# Patient Record
Sex: FEMALE | Race: OTHER | Employment: FULL TIME | URBAN - METROPOLITAN AREA
[De-identification: names, ages, dates, MRNs, and addresses within clinical notes are randomized per-mention and may not be internally consistent; named-entity substitution may affect disease eponyms.]

---

## 2019-06-10 ENCOUNTER — HOSPITAL ENCOUNTER (EMERGENCY)
Age: 34
Discharge: HOME | End: 2019-06-10
Payer: COMMERCIAL

## 2019-06-10 VITALS
SYSTOLIC BLOOD PRESSURE: 127 MMHG | HEIGHT: 63 IN | DIASTOLIC BLOOD PRESSURE: 84 MMHG | TEMPERATURE: 97.5 F | OXYGEN SATURATION: 100 % | HEART RATE: 84 BPM

## 2019-06-10 DIAGNOSIS — N30.00 ACUTE CYSTITIS WITHOUT HEMATURIA: Primary | ICD-10-CM

## 2019-06-10 LAB
BACTERIA: ABNORMAL /HPF
BILIRUBIN URINE: NEGATIVE
BLOOD, URINE: ABNORMAL
CLARITY: CLEAR
COLOR: YELLOW
EPITHELIAL CELLS, UA: ABNORMAL /HPF
GLUCOSE URINE: NEGATIVE MG/DL
KETONES, URINE: NEGATIVE MG/DL
LEUKOCYTE ESTERASE, URINE: ABNORMAL
NITRITE, URINE: NEGATIVE
PH UA: 6 (ref 5–9)
PROTEIN UA: NEGATIVE MG/DL
RBC UA: ABNORMAL /HPF (ref 0–2)
SPECIFIC GRAVITY UA: 1.01 (ref 1–1.03)
UROBILINOGEN, URINE: 0.2 E.U./DL
WBC UA: ABNORMAL /HPF (ref 0–5)

## 2019-06-10 PROCEDURE — 99283 EMERGENCY DEPT VISIT LOW MDM: CPT

## 2019-06-10 PROCEDURE — 81001 URINALYSIS AUTO W/SCOPE: CPT

## 2019-06-10 RX ORDER — NITROFURANTOIN 25; 75 MG/1; MG/1
100 CAPSULE ORAL 2 TIMES DAILY
Qty: 10 CAPSULE | Refills: 0 | Status: SHIPPED | OUTPATIENT
Start: 2019-06-10 | End: 2019-06-15

## 2019-06-10 SDOH — HEALTH STABILITY: MENTAL HEALTH: HOW OFTEN DO YOU HAVE A DRINK CONTAINING ALCOHOL?: NEVER

## 2019-06-10 ASSESSMENT — PAIN DESCRIPTION - DIRECTION: RADIATING_TOWARDS: INCREASES WITH MOVEMENT

## 2019-06-10 ASSESSMENT — PAIN DESCRIPTION - ORIENTATION: ORIENTATION: LOWER

## 2019-06-10 ASSESSMENT — PAIN DESCRIPTION - PAIN TYPE: TYPE: ACUTE PAIN

## 2019-06-10 ASSESSMENT — PAIN DESCRIPTION - FREQUENCY: FREQUENCY: INTERMITTENT

## 2019-06-10 ASSESSMENT — PAIN SCALES - GENERAL: PAINLEVEL_OUTOF10: 9

## 2019-06-12 NOTE — ED PROVIDER NOTES
[facility]       Department of Emergency Medicine   ED  Provider Note  Admit Date/RoomTime: 6/10/2019  9:46 AM  ED Room: 31/31  MRN: [id number]  Chief Complaint: Back Pain (Lower back pain that started 1 week ago. Pain increases with movement. Denies any injury. Pt is 11 weeks pregnant. )       History of Present Illness   Source of history provided by:  patient. History/Exam Limitations: none. Iker Robins is a 27 y.o. female who has a past medical history of: History reviewed. No pertinent past medical history. presents to the ED by private car and is alone for lower back pain, beginning several days ago and are unchanged since that time. The complaint has been persistent, moderate in severity. Patient states for the past week she has had lower back pain. States that she is currently 11 weeks pregnant. Denies any vaginal bleeding or spotting. States she has followed with her OB/GYN and has had a confirmed intrauterine pregnancy. Denies any complications thus far. Denies any fever, chills, nausea, vomiting, chest pain or shortness of breath. ROS    Pertinent positives and negatives are stated within HPI, all other systems reviewed and are negative. History reviewed. No pertinent surgical history. Social History:  reports that she has never smoked. She has never used smokeless tobacco. She reports that she does not drink alcohol. Family History: family history is not on file. Allergies: Pcn [penicillins]    Physical Exam   Oxygen Saturation Interpretation: Normal.   ED Triage Vitals   BP Temp Temp Source Pulse Resp SpO2 Height Weight   06/10/19 0944 06/10/19 0922 06/10/19 0922 06/10/19 0922 -- 06/10/19 0922 06/10/19 0944 --   127/84 97.6 °F (36.4 °C) Tympanic 76  98 % 5' 3\" (1.6 m)        Physical Exam  · Constitutional/General: Alert and oriented x3, well appearing, non toxic  · HEENT:  NC/NT. PERRLA,  Airway patent.   · Neck: Supple, full ROM, non tender to palpation in the midline, no stridor, no crepitus, no meningeal signs  · Respiratory: Lungs clear to auscultation bilaterally, no wheezes, rales, or rhonchi. Not in respiratory distress  · CV:  Regular rate. Regular rhythm. No murmurs, gallops, or rubs. 2+ distal pulses  · GI:  Abdomen Soft, Non tender, Non distended. +BS. No rebound, guarding, or rigidity. No pulsatile masses. · Musculoskeletal: Diffuse lumbar tenderness. No midline spinal tenderness. No CVA tenderness. Moves all extremities x 4. Warm and well perfused, no clubbing, cyanosis, or edema. Capillary refill <3 seconds  · Integument: skin warm and dry. No rashes. · Lymphatic: no lymphadenopathy noted  · Neurologic: GCS 15, no focal deficits, symmetric strength 5/5 in the upper and lower extremities bilaterally  · Psychiatric: Normal Affect    Lab / Imaging Results   (All laboratory and radiology results have been personally reviewed by myself)  Labs:  Results for orders placed or performed during the hospital encounter of 06/10/19   Urinalysis   Result Value Ref Range    Color, UA Yellow Straw/Yellow    Clarity, UA Clear Clear    Glucose, Ur Negative Negative mg/dL    Bilirubin Urine Negative Negative    Ketones, Urine Negative Negative mg/dL    Specific Gravity, UA 1.015 1.005 - 1.030    Blood, Urine TRACE-INTACT Negative    pH, UA 6.0 5.0 - 9.0    Protein, UA Negative Negative mg/dL    Urobilinogen, Urine 0.2 <2.0 E.U./dL    Nitrite, Urine Negative Negative    Leukocyte Esterase, Urine TRACE (A) Negative   Microscopic Urinalysis   Result Value Ref Range    WBC, UA 2-5 0 - 5 /HPF    RBC, UA 1-3 0 - 2 /HPF    Epi Cells RARE /HPF    Bacteria, UA FEW (A) /HPF     Imaging: All Radiology results interpreted by Radiologist unless otherwise noted. No orders to display       ED Course / Medical Decision Making   Medications - No data to display     Consult(s):   None    Procedure(s):   none    MDM:   Patient in no acute distress. Vital signs stable.   Patient denies any injury or trauma. No focal neurologic deficits noted on exam.  Patient currently 11 weeks pregnant. Urinalysis will be obtained. Urinalysis concerning for urinary tract infection. Patient states anaphylactic reaction to penicillins. Unsure if she is ever had cephalosporins. Discussed case with pharmacist.  Since patient is several days away from second trimester,  Will be started on Macrobid. Patient will follow with PCP. Educated on the signs and symptoms to return to the ED. Discharged in stable condition. Counseling: The emergency provider has spoken with the patient and discussed todays results, in addition to providing specific details for the plan of care and counseling regarding the diagnosis and prognosis. Questions are answered at this time and they are agreeable with the plan. Assessment      1. Acute cystitis without hematuria      Plan   Discharge to home  Patient condition is good    New Medications     Discharge Medication List as of 6/10/2019 10:57 AM      START taking these medications    Details   nitrofurantoin, macrocrystal-monohydrate, (MACROBID) 100 MG capsule Take 1 capsule by mouth 2 times daily for 5 days, Disp-10 capsule, R-0Print           Electronically signed by TAZ Olivarez   DD: 6/12/19  **This report was transcribed using voice recognition software. Every effort was made to ensure accuracy; however, inadvertent computerized transcription errors may be present.   END OF ED PROVIDER NOTE       John Olivarez  06/12/19 [id number]

## 2019-08-07 ENCOUNTER — ROUTINE PRENATAL (OUTPATIENT)
Dept: OBGYN CLINIC | Age: 34
End: 2019-08-07
Payer: COMMERCIAL

## 2019-08-07 VITALS
DIASTOLIC BLOOD PRESSURE: 75 MMHG | WEIGHT: 194 LBS | HEIGHT: 63 IN | BODY MASS INDEX: 34.38 KG/M2 | HEART RATE: 98 BPM | SYSTOLIC BLOOD PRESSURE: 125 MMHG

## 2019-08-07 DIAGNOSIS — Z13.79 ENCOUNTER FOR OTHER SCREENING FOR GENETIC AND CHROMOSOMAL ANOMALIES: ICD-10-CM

## 2019-08-07 DIAGNOSIS — O09.522 ELDERLY MULTIGRAVIDA, SECOND TRIMESTER: Primary | ICD-10-CM

## 2019-08-07 DIAGNOSIS — Z3A.19 19 WEEKS GESTATION OF PREGNANCY: ICD-10-CM

## 2019-08-07 DIAGNOSIS — O09.212 PREVIOUS PRETERM DELIVERY IN SECOND TRIMESTER, ANTEPARTUM: ICD-10-CM

## 2019-08-07 DIAGNOSIS — O44.42 LOW-LYING PLACENTA WITHOUT HEMORRHAGE, SECOND TRIMESTER: ICD-10-CM

## 2019-08-07 DIAGNOSIS — O99.322 DRUG DEPENDENCE AFFECTING PREGNANCY IN SECOND TRIMESTER: ICD-10-CM

## 2019-08-07 DIAGNOSIS — Z36.89 ENCOUNTER FOR FETAL ANATOMIC SURVEY: ICD-10-CM

## 2019-08-07 DIAGNOSIS — F12.20 CANNABIS DEPENDENCE (HCC): ICD-10-CM

## 2019-08-07 DIAGNOSIS — Z03.75 SUSPECTED SHORTENING OF CERVIX NOT FOUND: ICD-10-CM

## 2019-08-07 DIAGNOSIS — O99.212 OBESITY AFFECTING PREGNANCY IN SECOND TRIMESTER: ICD-10-CM

## 2019-08-07 LAB
GLUCOSE URINE, POC: NORMAL
PROTEIN UA: POSITIVE

## 2019-08-07 PROCEDURE — G8427 DOCREV CUR MEDS BY ELIG CLIN: HCPCS | Performed by: OBSTETRICS & GYNECOLOGY

## 2019-08-07 PROCEDURE — 99201 HC NEW PT, E/M LEVEL 1: CPT | Performed by: OBSTETRICS & GYNECOLOGY

## 2019-08-07 PROCEDURE — G8417 CALC BMI ABV UP PARAM F/U: HCPCS | Performed by: OBSTETRICS & GYNECOLOGY

## 2019-08-07 PROCEDURE — 76817 TRANSVAGINAL US OBSTETRIC: CPT | Performed by: OBSTETRICS & GYNECOLOGY

## 2019-08-07 PROCEDURE — 36415 COLL VENOUS BLD VENIPUNCTURE: CPT

## 2019-08-07 PROCEDURE — 81002 URINALYSIS NONAUTO W/O SCOPE: CPT | Performed by: OBSTETRICS & GYNECOLOGY

## 2019-08-07 PROCEDURE — 99242 OFF/OP CONSLTJ NEW/EST SF 20: CPT | Performed by: OBSTETRICS & GYNECOLOGY

## 2019-08-07 PROCEDURE — 76811 OB US DETAILED SNGL FETUS: CPT | Performed by: OBSTETRICS & GYNECOLOGY

## 2019-08-07 RX ORDER — HYDROXYPROGESTERONE CAPROATE 250 MG/ML
250 INJECTION INTRAMUSCULAR
Qty: 1 VIAL | Refills: 3
Start: 2019-08-07 | End: 2019-12-04 | Stop reason: ALTCHOICE

## 2019-08-07 RX ORDER — VITAMIN A, VITAMIN C, VITAMIN D-3, VITAMIN E, VITAMIN B-1, VITAMIN B-2, NIACIN, VITAMIN B-6, CALCIUM, IRON, ZINC, COPPER 4000; 120; 400; 22; 1.84; 3; 20; 10; 1; 12; 200; 27; 25; 2 [IU]/1; MG/1; [IU]/1; MG/1; MG/1; MG/1; MG/1; MG/1; MG/1; UG/1; MG/1; MG/1; MG/1; MG/1
TABLET ORAL
Refills: 1 | COMMUNITY
Start: 2019-05-17 | End: 2019-12-19

## 2019-08-07 NOTE — LETTER
CARDIOVASCULAR :  No pain, no palpitations, no edema   RESPIRATORY :  No pain, no shortness of breath   GASTROINTESTINAL : No N/V, no D/C, no abdominal pain   GENITOURINARY :  No dysuria, hematuria and no incontinence   MUSCULOSKELETAL:  No myalgia, No back pain  NEUROLOGICAL :  No numbness, no tingling, no tremors. No history of seizures    FAMILY MEDICAL HISTORY:   Negative for birth defects, chromosomal anomalies and Mental retardation. OB Genetic Screening    Patient's Age 35+ at Date of Delivery Yes     Thalassemia MCV<80 No     Neural Tube Defect No     Congenital Heart Defect No     Down Syndrome No     Ben-Sachs No     Sickle Cell Disease or Trait No     Hemophilia No     Muscular Dystrophy No     Cystic Fibrosis No     [place] Chorea No     Mental Retardation/Autism No     Was Person Treated for Fragilex? No     Other Inherited Genetic Chromosomal Disorder? No     Maternal Metabolic Disorder No     Patient or [de-identified] Father Had Other Defects? No     Recurrent Pregnancy Loss or Still Birth? No      OB Infection History    Live with Someone with or Exposed to TB? No     Patient or Partner has Hx of Genital Herpes? No     Rash or Viral Illness Since LMP? No     History of STD/GC/Chlamydia/HPV/Syphilis? No      During this pregnancy, Mrs Freddi Brunner developed cystitis that was evaluated and treated on 6/10/2019 in the emergency room. Otherwise she had an uneventful course of pregnancy so far. When seen today in our office she had no complaints. PHYSICAL EXAMINATION:    General Appearance:  Healthy looking, alert, no acute distress. Eyes:     No pallor, no icterus, no photophobia. Ears:     No ear drainage. Nose:     No nasal drainage, no paranasal sinus tenderness. Throat:   Mucosa moist, no oral thrush, no exudate. Neck:     No nuchal rigidity. Back:     No CVA tenderness. Abdomen:    Soft nontender.

## 2019-08-07 NOTE — PROGRESS NOTES
chromosomal anomalies, only the amniocentesis does. 6. I offered the amniocentesis to the patient and she declined it . I discussed the cell free DNA test (NIPT) with the patient. I advised her that this a screening test not a diagnostic test.The test screens only for trisomy 21, 18 and 13. It does not replace diagnostic genetic testing. She agreed to have the test. I told the patient that if she changes her mind, she can call our office and schedule the amniocentesis, anytime. She is aware that the  results of the amniocentesis may take 2 weeks or more to complete. The information from the amniocentesis would need to be available by 23 weeks gestation if she plans to act on a abnormal result. Termination of pregnancy is illegal after 24 completed weeks in [place]. 7. Obesity is assosiated with an increased risk of developing gestational diabetes, and disturbance in the growth of her baby, such as Large for dates and small for Dates. Gestational diabetes should be ruled out with a  Glucose tolerance test to be done in your office. If negative it should be repeated at 26-28 weeks of gestation. 8. Weekly Intramuscular injections of 250 mg of 17 AlphahydroxyProgesterone Caproate (17p), starting at 16-18 weeks of gestation, has been shown to decrease the likelihood of another  delivery. I recommend initiation of treatment at 16 weeks. I also recommend serial ultrasound evaluations of cervical length. The cervical length correlates with the risk of  delivery. The cervical length today was reassuring. 9. The ill effects of cigarette smoking during pregnancy and its association with an increased risk of intrauterine growth restriction, placental abruption, and pregnancy loss. In addition to the increased risk of  morbidity and mortality there is an increased risk of sudden infant death syndrome in the households where people smoke. I recommend that she continues to abstain from smoking. 10.  The edge of the placenta is within 2 cm of the internal os, and therefore there is still a risk of vaginal bleeding, and need for delivery if she goes into  labor and her cervix starts to dilate. There is still a good chance that the placenta will pull further away from the internal os with advancing gestation. 11. She is to continue to follow with you in your office for ongoing obstetric care. 12. I recommend follow-up ultrasound evaluation in our office in 6 weeks to check on fetal well-being anatomy and growth and on cervical length and resolution of the previa. Thank you again, doctor, for allowing us to be of service to your patient. If I can be of further assistance, please do not hesitate to call. Sincerely,        Gwen Jones M.D., [address]    Current encounter billing:  RI OFFICE CONSULTATION NEW/ESTAB PATIENT 30 MIN [21244]  US OB Detail Fetal Anatomy Single or 1st [[id number] Custom]  US OB Transvaginal [id number] Custom]    **This report has been created using voice recognition software.  It may contain minor errors     which are inherent in voice recognition technology**

## 2019-08-07 NOTE — PATIENT INSTRUCTIONS
Please arrive for your scheduled appointment at least 15 minutes early with your actual insurance card+ a photo ID. Also if you need any refills ordered or have questions, it may take up 48 hours to reply. Please allow ample time for your refills. Call me when you use last refill. Thank you for your cooperation. Call your primary obstetrician with bleeding, leaking of fluid, abdominal tenderness, headache, blurry vision, epigastric pain and increased urinary frequency. Any questions contact Richard at [phone]. If you are experiencing an emergency and need immediate help, call 911 or go to go emergency room or labor and delivery. if you are sick, not feeling well or have an infectious process going on please reschedule your appointment by calling [phone]. Also if any family members are not feeling well, please do not bring them to your appointment. We appreciate your cooperation. We are doing this in order to protect our pregnant mothers+ their babies. Patient Education        Weeks 18 to 22 of Your Pregnancy: Care Instructions  Your Care Instructions    Your baby is continuing to develop quickly. At this stage, babies can now suck their thumbs,  firmly with their hands, and open and close their eyelids. Sometime between 18 and 22 weeks, you will start to feel your baby move. At first, these small fetal movements feel like fluttering or \"butterflies. \" Some women say that they feel like gas bubbles. As the baby grows, these movements will become stronger. You may also notice that your baby kicks and hiccups. During this time, you may find that your nausea and fatigue are gone. Overall, you may feel better and have more energy than you did in your first trimester. But you may also have new discomforts now, such as sleep problems or leg cramps. This care sheet can help you ease these discomforts. Follow-up care is a key part of your treatment and safety.  Be sure to make and go to all appointments, and call your doctor if you are having problems. It's also a good idea to know your test results and keep a list of the medicines you take. How can you care for yourself at home? Ease sleep problems  · Avoid caffeine in drinks or chocolate late in the day. · Get some exercise every day. · Take a warm shower or bath before bed. · Have a light snack or glass of milk at bedtime. · Do relaxation exercises in bed to calm your mind and body. · Support your legs and back with extra pillows. Try a pillow between your legs if you sleep on your side. · Do not use sleeping pills or alcohol. They could harm your baby. Ease leg cramps  · Do not massage your calf during the cramp. · Sit on a firm bed or chair. Straighten your leg, and bend your foot (flex your ankle) slowly upward, toward your knee. Bend your toes up and down. · Stand on a cool, flat surface. Stretch your toes upward, and take small steps walking on your heels. · Use a heating pad or hot water bottle to help with muscle ache. Prevent leg cramps  · Be sure to get enough calcium. If you are worried that you are not getting enough, talk to your doctor. · Exercise every day, and stretch your legs before bed. · Take a warm bath before bed, and try leg warmers at night. Where can you learn more? Go to https://[employer]. org and sign in to your [employer] account. Enter H386 in the [facility] box to learn more about \"Weeks 18 to 22 of Your Pregnancy: Care Instructions. \"     If you do not have an account, please click on the \"Sign Up Now\" link. Current as of: September 5, 2018  Content Version: 12.0  © 3177-2532 Healthwise, [employer]. Care instructions adapted under license by [facility] ([facility]). If you have questions about a medical condition or this instruction, always ask your healthcare professional. [address] any warranty or liability for your use of this information.          Patient Education Learning About When to Call Your Doctor During Pregnancy (Up to 20 Weeks)  Your Care Instructions    It's common to have concerns about what might be a problem during pregnancy. Although most pregnant women don't have any serious problems, it's important to know when to call your doctor if you have certain symptoms. These are general suggestions. Your doctor may give you some more information about when to call. When to call your doctor (up to 20 weeks)  Call 911 anytime you think you may need emergency care. For example, call if:  · You passed out (lost consciousness). Call your doctor now or seek immediate medical care if:  · You have a fever. · You have vaginal bleeding. · You are dizzy or lightheaded, or you feel like you may faint. · You have symptoms of a urinary tract infection. These may include:  ? Pain or burning when you urinate. ? A frequent need to urinate without being able to pass much urine. ? Pain in the flank, which is just below the rib cage and above the waist on either side of the back. ? Blood in your urine. · You have belly pain. · You think you are having contractions. · You have a sudden release of fluid from your vagina. Watch closely for changes in your health, and be sure to contact your doctor if:  · You have vaginal discharge that smells bad. · You have other concerns about your pregnancy. Follow-up care is a key part of your treatment and safety. Be sure to make and go to all appointments, and call your doctor if you are having problems. It's also a good idea to know your test results and keep a list of the medicines you take. Where can you learn more? Go to https://karie.[employer]. org and sign in to your [employer] account. Enter C731 in the [employer] box to learn more about \"Learning About When to Call Your Doctor During Pregnancy (Up to 20 Weeks). \"     If you do not have an account, please click on the \"Sign Up Now\" link.   Current as of: September 5, 2018  Content Version: 12.0  © 7275-5375 Healthwise, Incorporated. Care instructions adapted under license by [facility] ([facility]). If you have questions about a medical condition or this instruction, always ask your healthcare professional. [address] any warranty or liability for your use of this information.

## 2019-08-15 ENCOUNTER — TELEPHONE (OUTPATIENT)
Dept: OBGYN CLINIC | Age: 34
End: 2019-08-15

## 2019-09-18 ENCOUNTER — ROUTINE PRENATAL (OUTPATIENT)
Dept: OBGYN CLINIC | Age: 34
End: 2019-09-18
Payer: COMMERCIAL

## 2019-09-18 VITALS
SYSTOLIC BLOOD PRESSURE: 118 MMHG | HEART RATE: 102 BPM | DIASTOLIC BLOOD PRESSURE: 76 MMHG | WEIGHT: 207.13 LBS | BODY MASS INDEX: 36.69 KG/M2

## 2019-09-18 DIAGNOSIS — O44.42 LOW-LYING PLACENTA WITHOUT HEMORRHAGE, SECOND TRIMESTER: ICD-10-CM

## 2019-09-18 DIAGNOSIS — O09.522 ELDERLY MULTIGRAVIDA, SECOND TRIMESTER: Primary | ICD-10-CM

## 2019-09-18 DIAGNOSIS — Z3A.25 25 WEEKS GESTATION OF PREGNANCY: ICD-10-CM

## 2019-09-18 DIAGNOSIS — Z03.75 SUSPECTED SHORTENING OF CERVIX NOT FOUND: ICD-10-CM

## 2019-09-18 DIAGNOSIS — O09.212 PREVIOUS PRETERM DELIVERY IN SECOND TRIMESTER, ANTEPARTUM: ICD-10-CM

## 2019-09-18 DIAGNOSIS — Z36.89 ENCOUNTER FOR FETAL ANATOMIC SURVEY: ICD-10-CM

## 2019-09-18 DIAGNOSIS — O99.212 OBESITY AFFECTING PREGNANCY IN SECOND TRIMESTER: ICD-10-CM

## 2019-09-18 LAB
GLUCOSE URINE, POC: NORMAL
PROTEIN UA: POSITIVE

## 2019-09-18 PROCEDURE — 76819 FETAL BIOPHYS PROFIL W/O NST: CPT | Performed by: OBSTETRICS & GYNECOLOGY

## 2019-09-18 PROCEDURE — 81002 URINALYSIS NONAUTO W/O SCOPE: CPT | Performed by: OBSTETRICS & GYNECOLOGY

## 2019-09-18 PROCEDURE — 76817 TRANSVAGINAL US OBSTETRIC: CPT | Performed by: OBSTETRICS & GYNECOLOGY

## 2019-09-18 PROCEDURE — G8417 CALC BMI ABV UP PARAM F/U: HCPCS | Performed by: OBSTETRICS & GYNECOLOGY

## 2019-09-18 PROCEDURE — 99211 OFF/OP EST MAY X REQ PHY/QHP: CPT | Performed by: OBSTETRICS & GYNECOLOGY

## 2019-09-18 PROCEDURE — G8427 DOCREV CUR MEDS BY ELIG CLIN: HCPCS | Performed by: OBSTETRICS & GYNECOLOGY

## 2019-09-18 PROCEDURE — 99213 OFFICE O/P EST LOW 20 MIN: CPT | Performed by: OBSTETRICS & GYNECOLOGY

## 2019-09-18 PROCEDURE — 76805 OB US >/= 14 WKS SNGL FETUS: CPT | Performed by: OBSTETRICS & GYNECOLOGY

## 2019-09-18 PROCEDURE — 1036F TOBACCO NON-USER: CPT | Performed by: OBSTETRICS & GYNECOLOGY

## 2019-09-18 NOTE — LETTER
19     RE:  Jimmie Girard   : 1985   AGE: 27 y.o. REFERRING PHYSICIAN:               Magi Hawkins MD    Dear .  Mrs. Jimmie Girard a 27 y.o.  [id number]  is seen today on follow up in our office. REASON FOR APPOINTMENT:  · Follow-up on pregnant patient with advanced maternal age, history of previous  delivery, obesity marijuana abuse and low-lying placenta on previous ultrasound evaluation. MEDICATIONS:      Prior to Admission medications    Medication Sig Start Date End Date Taking? Authorizing Provider   Prenatal Vit-Fe Fumarate-FA (PRENATAL VITAMIN PLUS LOW IRON) 27-1 MG TABS take 1 tablet by mouth once daily 19  Yes Historical Provider, MD   hydroxyprogesterone caproate 250 MG/ML OIL oil injection Inject 1 mL into the muscle every 7 days 19  Yes Mireya Carranza MD     INTERVAL HISTORY:  Mrs Jimmie Girard had an uneventful course of pregnancy since her last visit to our office. When seen today in our office she had no complaints. She denied any recent cigarette smoking of marijuana abuse. No interval history of vaginal bleeding or spotting. PHYSICAL EXAMINATION:  General Appearance:  Healthy looking, alert, no acute distress. Eyes:     No pallor, no icterus, no photophobia. Ears:     No ear drainage. Nose:     No nasal drainage, no paranasal sinus tenderness. Throat:   Mucosa moist, no oral thrush, no exudate. Neck:     No nuchal rigidity. Back:     No CVA tenderness. Abdomen:    Soft nontender. Extremities:    No pretibial pitting edema, no calf muscle tenderness. Skin:     No rashes, no lesions. BP: 118/76 Weight: 207 lb 2 oz (94 kg)   Pulse: 102     Body mass index is 36.69 kg/m². Urine dipstick:  Glucose : Negative   Albumin:  Trace       An ultrasound evaluation was done in our office today. Please refer to the enclosed copy of the ultrasound report for further information. IMPRESSION:  1. A  25w2d  intrauterine gestation. 2. Elderly multigravida. 3. Obesity. 4. Previous  delivery. 5. Marijuana abuse during pregnancy. 6. Previous Low-lying placenta, resolved. RECOMMENDATIONS/PLAN:  I discussed with the patient the following points:    1. The benefits and limitations of ultrasound prenatal diagnosis and the fact that some defects might not always be seen by ultrasound. Estimated incidence of these defects in the general population is 2- 4%. 2. The size of her baby is appropriate for gestational age and no anomalies are noted. .  3. Obesity is assosiated with an increased risk of developing gestational diabetes, and disturbance in the growth of her baby, such as Large for dates and small for Dates. Gestational diabetes should be ruled out with a  Glucose tolerance test to be done in your office at 26-28 weeks of gestation. 4. The ill effects of cigarette smoking and substance abuse during pregnancy and its association with an increased risk of intrauterine growth restriction, placental abruption, and pregnancy loss. In addition to the increased risk of  morbidity and mortality there is an increased risk of sudden infant death syndrome in the households where people smoke. I recommend that she continues to abstain from cigarette smoking and from illicit drug abuse. 5. She should continue the weekly Intramuscular injections of 250 mg of 17 AlphahydroxyProgesterone Caproate (17p),  to decrease the likelihood of another  delivery. 6. The cervical length correlates with the risk of  delivery. The cervical length today was reassuring. 7. The previously described placenta previa resolved. 8. Fetal well-being was confirmed today.   The amount of fluid around baby is normal.  The Biophysical profile score of 8/8 is reassuring, and the umbilical artery Doppler studies are normal.  9. She should monitor fetal well-being at home by counting movements after

## 2019-09-18 NOTE — PATIENT INSTRUCTIONS
20 Weeks). \"     If you do not have an account, please click on the \"Sign Up Now\" link. Current as of: September 5, 2018  Content Version: 12.1  © 7932-7169 Healthwise, Incorporated. Care instructions adapted under license by [facility] ([facility]). If you have questions about a medical condition or this instruction, always ask your healthcare professional. [address] any warranty or liability for your use of this information.

## 2019-09-18 NOTE — PROGRESS NOTES
multigravida. 3. Obesity. 4. Previous  delivery. 5. Marijuana abuse during pregnancy. 6. Previous Low-lying placenta, resolved. RECOMMENDATIONS/PLAN:  I discussed with the patient the following points:    1. The benefits and limitations of ultrasound prenatal diagnosis and the fact that some defects might not always be seen by ultrasound. Estimated incidence of these defects in the general population is 2- 4%. 2. The size of her baby is appropriate for gestational age and no anomalies are noted. .  3. Obesity is assosiated with an increased risk of developing gestational diabetes, and disturbance in the growth of her baby, such as Large for dates and small for Dates. Gestational diabetes should be ruled out with a  Glucose tolerance test to be done in your office at 26-28 weeks of gestation. 4. The ill effects of cigarette smoking and substance abuse during pregnancy and its association with an increased risk of intrauterine growth restriction, placental abruption, and pregnancy loss. In addition to the increased risk of  morbidity and mortality there is an increased risk of sudden infant death syndrome in the households where people smoke. I recommend that she continues to abstain from cigarette smoking and from illicit drug abuse. 5. She should continue the weekly Intramuscular injections of 250 mg of 17 AlphahydroxyProgesterone Caproate (17p),  to decrease the likelihood of another  delivery. 6. The cervical length correlates with the risk of  delivery. The cervical length today was reassuring. 7. The previously described placenta previa resolved. 8. Fetal well-being was confirmed today. The amount of fluid around baby is normal.  The Biophysical profile score of 8/8 is reassuring, and the umbilical artery Doppler studies are normal.  9. She should monitor fetal well-being at home by counting movements after dinner.   Her baby should  move 10 times in 2 hours;

## 2019-10-09 ENCOUNTER — TELEPHONE (OUTPATIENT)
Dept: OBGYN CLINIC | Age: 34
End: 2019-10-09

## 2019-10-16 ENCOUNTER — ROUTINE PRENATAL (OUTPATIENT)
Dept: OBGYN CLINIC | Age: 34
End: 2019-10-16
Payer: COMMERCIAL

## 2019-10-16 ENCOUNTER — TELEPHONE (OUTPATIENT)
Dept: OBGYN CLINIC | Age: 34
End: 2019-10-16

## 2019-10-16 VITALS
HEART RATE: 111 BPM | SYSTOLIC BLOOD PRESSURE: 125 MMHG | HEIGHT: 63 IN | WEIGHT: 214 LBS | BODY MASS INDEX: 37.92 KG/M2 | DIASTOLIC BLOOD PRESSURE: 79 MMHG

## 2019-10-16 DIAGNOSIS — O09.213 PREVIOUS PRETERM DELIVERY IN THIRD TRIMESTER, ANTEPARTUM: ICD-10-CM

## 2019-10-16 DIAGNOSIS — O99.213 OBESITY AFFECTING PREGNANCY IN THIRD TRIMESTER: ICD-10-CM

## 2019-10-16 DIAGNOSIS — O36.63X0 EXCESSIVE FETAL GROWTH AFFECTING MANAGEMENT OF MOTHER IN SINGLETON PREGNANCY IN THIRD TRIMESTER, ANTEPARTUM: ICD-10-CM

## 2019-10-16 DIAGNOSIS — O09.523 ELDERLY MULTIGRAVIDA, THIRD TRIMESTER: Primary | ICD-10-CM

## 2019-10-16 DIAGNOSIS — Z03.75 SUSPECTED SHORTENING OF CERVIX NOT FOUND: ICD-10-CM

## 2019-10-16 DIAGNOSIS — O24.414 INSULIN CONTROLLED GESTATIONAL DIABETES MELLITUS (GDM) IN THIRD TRIMESTER: ICD-10-CM

## 2019-10-16 DIAGNOSIS — Z3A.29 29 WEEKS GESTATION OF PREGNANCY: ICD-10-CM

## 2019-10-16 PROCEDURE — 99214 OFFICE O/P EST MOD 30 MIN: CPT | Performed by: OBSTETRICS & GYNECOLOGY

## 2019-10-16 PROCEDURE — G8417 CALC BMI ABV UP PARAM F/U: HCPCS | Performed by: OBSTETRICS & GYNECOLOGY

## 2019-10-16 PROCEDURE — G8484 FLU IMMUNIZE NO ADMIN: HCPCS | Performed by: OBSTETRICS & GYNECOLOGY

## 2019-10-16 PROCEDURE — 76817 TRANSVAGINAL US OBSTETRIC: CPT | Performed by: OBSTETRICS & GYNECOLOGY

## 2019-10-16 PROCEDURE — 76819 FETAL BIOPHYS PROFIL W/O NST: CPT | Performed by: OBSTETRICS & GYNECOLOGY

## 2019-10-16 PROCEDURE — 99211 OFF/OP EST MAY X REQ PHY/QHP: CPT | Performed by: OBSTETRICS & GYNECOLOGY

## 2019-10-16 PROCEDURE — G8427 DOCREV CUR MEDS BY ELIG CLIN: HCPCS | Performed by: OBSTETRICS & GYNECOLOGY

## 2019-10-16 PROCEDURE — 76816 OB US FOLLOW-UP PER FETUS: CPT | Performed by: OBSTETRICS & GYNECOLOGY

## 2019-10-16 PROCEDURE — 1036F TOBACCO NON-USER: CPT | Performed by: OBSTETRICS & GYNECOLOGY

## 2019-10-16 RX ORDER — LANCETS 30 GAUGE
EACH MISCELLANEOUS
Qty: 120 EACH | Refills: 3 | Status: ON HOLD | OUTPATIENT
Start: 2019-10-16 | End: 2019-12-12 | Stop reason: HOSPADM

## 2019-10-16 RX ORDER — BLOOD-GLUCOSE METER
1 KIT MISCELLANEOUS DAILY PRN
Qty: 1 KIT | Refills: 0 | Status: ON HOLD | OUTPATIENT
Start: 2019-10-16 | End: 2019-12-12 | Stop reason: HOSPADM

## 2019-10-22 ENCOUNTER — HOSPITAL ENCOUNTER (OUTPATIENT)
Dept: DIABETES SERVICES | Age: 34
Setting detail: THERAPIES SERIES
Discharge: HOME | End: 2019-10-22
Payer: COMMERCIAL

## 2019-10-22 VITALS — WEIGHT: 214 LBS | HEIGHT: 63 IN | BODY MASS INDEX: 37.92 KG/M2

## 2019-10-22 PROCEDURE — G0108 DIAB MANAGE TRN  PER INDIV: HCPCS | Performed by: DIETITIAN, REGISTERED

## 2019-10-22 ASSESSMENT — PATIENT HEALTH QUESTIONNAIRE - PHQ9
2. FEELING DOWN, DEPRESSED OR HOPELESS: 0
1. LITTLE INTEREST OR PLEASURE IN DOING THINGS: 0
SUM OF ALL RESPONSES TO PHQ QUESTIONS 1-9: 0
SUM OF ALL RESPONSES TO PHQ9 QUESTIONS 1 & 2: 0
SUM OF ALL RESPONSES TO PHQ QUESTIONS 1-9: 0

## 2019-10-29 ENCOUNTER — ROUTINE PRENATAL (OUTPATIENT)
Dept: OBGYN CLINIC | Age: 34
End: 2019-10-29
Payer: COMMERCIAL

## 2019-10-29 ENCOUNTER — TELEPHONE (OUTPATIENT)
Dept: OBGYN CLINIC | Age: 34
End: 2019-10-29

## 2019-10-29 ENCOUNTER — HOSPITAL ENCOUNTER (OUTPATIENT)
Age: 34
Discharge: HOME | End: 2019-10-30
Attending: OBSTETRICS & GYNECOLOGY | Admitting: OBSTETRICS & GYNECOLOGY
Payer: COMMERCIAL

## 2019-10-29 VITALS
HEIGHT: 63 IN | WEIGHT: 213 LBS | BODY MASS INDEX: 37.74 KG/M2 | DIASTOLIC BLOOD PRESSURE: 79 MMHG | HEART RATE: 99 BPM | SYSTOLIC BLOOD PRESSURE: 135 MMHG

## 2019-10-29 DIAGNOSIS — O99.213 OBESITY AFFECTING PREGNANCY IN THIRD TRIMESTER: ICD-10-CM

## 2019-10-29 DIAGNOSIS — O09.523 ELDERLY MULTIGRAVIDA, THIRD TRIMESTER: ICD-10-CM

## 2019-10-29 DIAGNOSIS — O36.63X0 EXCESSIVE FETAL GROWTH AFFECTING MANAGEMENT OF MOTHER IN SINGLETON PREGNANCY IN THIRD TRIMESTER, ANTEPARTUM: ICD-10-CM

## 2019-10-29 DIAGNOSIS — Z3A.31 31 WEEKS GESTATION OF PREGNANCY: ICD-10-CM

## 2019-10-29 DIAGNOSIS — Z03.75 SUSPECTED SHORTENING OF CERVIX NOT FOUND: ICD-10-CM

## 2019-10-29 DIAGNOSIS — O09.213 PREVIOUS PRETERM DELIVERY IN THIRD TRIMESTER, ANTEPARTUM: ICD-10-CM

## 2019-10-29 DIAGNOSIS — O24.414 INSULIN CONTROLLED GESTATIONAL DIABETES MELLITUS (GDM) IN THIRD TRIMESTER: Primary | ICD-10-CM

## 2019-10-29 PROBLEM — O24.419 GESTATIONAL DIABETES MELLITUS (GDM) AFFECTING PREGNANCY: Status: ACTIVE | Noted: 2019-10-29

## 2019-10-29 LAB
ABO/RH: NORMAL
ALBUMIN SERPL-MCNC: 3.7 G/DL (ref 3.5–5.2)
ALP BLD-CCNC: 109 U/L (ref 35–104)
ALT SERPL-CCNC: 7 U/L (ref 0–32)
ANION GAP SERPL CALCULATED.3IONS-SCNC: 16 MMOL/L (ref 7–16)
ANTIBODY SCREEN: NORMAL
AST SERPL-CCNC: 9 U/L (ref 0–31)
BACTERIA: ABNORMAL /HPF
BILIRUB SERPL-MCNC: <0.2 MG/DL (ref 0–1.2)
BILIRUBIN URINE: NEGATIVE
BLOOD, URINE: ABNORMAL
BUN BLDV-MCNC: 6 MG/DL (ref 6–20)
CALCIUM SERPL-MCNC: 9.5 MG/DL (ref 8.6–10.2)
CHLORIDE BLD-SCNC: 97 MMOL/L (ref 98–107)
CLARITY: CLEAR
CO2: 21 MMOL/L (ref 22–29)
COLOR: YELLOW
CREAT SERPL-MCNC: 0.4 MG/DL (ref 0.5–1)
EPITHELIAL CELLS, UA: ABNORMAL /HPF
GFR AFRICAN AMERICAN: >60
GFR NON-AFRICAN AMERICAN: >60 ML/MIN/1.73
GLUCOSE BLD-MCNC: 165 MG/DL (ref 74–99)
GLUCOSE URINE, POC: NEGATIVE
GLUCOSE URINE: NEGATIVE MG/DL
HCT VFR BLD CALC: 32.1 % (ref 34–48)
HEMOGLOBIN: 10.1 G/DL (ref 11.5–15.5)
KETONES, URINE: ABNORMAL MG/DL
LEUKOCYTE ESTERASE, URINE: NEGATIVE
MCH RBC QN AUTO: 27.4 PG (ref 26–35)
MCHC RBC AUTO-ENTMCNC: 31.5 % (ref 32–34.5)
MCV RBC AUTO: 87.2 FL (ref 80–99.9)
METER GLUCOSE: 128 MG/DL (ref 74–99)
METER GLUCOSE: 173 MG/DL (ref 74–99)
NITRITE, URINE: NEGATIVE
PDW BLD-RTO: 13.6 FL (ref 11.5–15)
PH UA: 6 (ref 5–9)
PLATELET # BLD: 240 E9/L (ref 130–450)
PMV BLD AUTO: 10.3 FL (ref 7–12)
POTASSIUM SERPL-SCNC: 3.6 MMOL/L (ref 3.5–5)
PROTEIN UA: ABNORMAL MG/DL
PROTEIN UA: POSITIVE
RBC # BLD: 3.68 E12/L (ref 3.5–5.5)
RBC UA: ABNORMAL /HPF (ref 0–2)
SODIUM BLD-SCNC: 134 MMOL/L (ref 132–146)
SPECIFIC GRAVITY UA: 1.02 (ref 1–1.03)
TOTAL PROTEIN: 7.8 G/DL (ref 6.4–8.3)
UROBILINOGEN, URINE: 0.2 E.U./DL
WBC # BLD: 16.9 E9/L (ref 4.5–11.5)
WBC UA: ABNORMAL /HPF (ref 0–5)

## 2019-10-29 PROCEDURE — 1036F TOBACCO NON-USER: CPT | Performed by: OBSTETRICS & GYNECOLOGY

## 2019-10-29 PROCEDURE — 81002 URINALYSIS NONAUTO W/O SCOPE: CPT | Performed by: OBSTETRICS & GYNECOLOGY

## 2019-10-29 PROCEDURE — 86900 BLOOD TYPING SEROLOGIC ABO: CPT

## 2019-10-29 PROCEDURE — 86901 BLOOD TYPING SEROLOGIC RH(D): CPT

## 2019-10-29 PROCEDURE — 81001 URINALYSIS AUTO W/SCOPE: CPT

## 2019-10-29 PROCEDURE — 76815 OB US LIMITED FETUS(S): CPT | Performed by: OBSTETRICS & GYNECOLOGY

## 2019-10-29 PROCEDURE — 85027 COMPLETE CBC AUTOMATED: CPT

## 2019-10-29 PROCEDURE — 86850 RBC ANTIBODY SCREEN: CPT

## 2019-10-29 PROCEDURE — 82962 GLUCOSE BLOOD TEST: CPT

## 2019-10-29 PROCEDURE — 87088 URINE BACTERIA CULTURE: CPT

## 2019-10-29 PROCEDURE — G8484 FLU IMMUNIZE NO ADMIN: HCPCS | Performed by: OBSTETRICS & GYNECOLOGY

## 2019-10-29 PROCEDURE — 76819 FETAL BIOPHYS PROFIL W/O NST: CPT | Performed by: OBSTETRICS & GYNECOLOGY

## 2019-10-29 PROCEDURE — 99213 OFFICE O/P EST LOW 20 MIN: CPT | Performed by: OBSTETRICS & GYNECOLOGY

## 2019-10-29 PROCEDURE — 76817 TRANSVAGINAL US OBSTETRIC: CPT | Performed by: OBSTETRICS & GYNECOLOGY

## 2019-10-29 PROCEDURE — 99211 OFF/OP EST MAY X REQ PHY/QHP: CPT | Performed by: OBSTETRICS & GYNECOLOGY

## 2019-10-29 PROCEDURE — 36415 COLL VENOUS BLD VENIPUNCTURE: CPT

## 2019-10-29 PROCEDURE — G8427 DOCREV CUR MEDS BY ELIG CLIN: HCPCS | Performed by: OBSTETRICS & GYNECOLOGY

## 2019-10-29 PROCEDURE — 99243 OFF/OP CNSLTJ NEW/EST LOW 30: CPT | Performed by: INTERNAL MEDICINE

## 2019-10-29 PROCEDURE — G8417 CALC BMI ABV UP PARAM F/U: HCPCS | Performed by: OBSTETRICS & GYNECOLOGY

## 2019-10-29 PROCEDURE — 6370000000 HC RX 637 (ALT 250 FOR IP): Performed by: OBSTETRICS & GYNECOLOGY

## 2019-10-29 PROCEDURE — 80053 COMPREHEN METABOLIC PANEL: CPT

## 2019-10-29 RX ORDER — INSULIN GLARGINE 100 [IU]/ML
8 INJECTION, SOLUTION SUBCUTANEOUS NIGHTLY
Status: DISCONTINUED | OUTPATIENT
Start: 2019-10-29 | End: 2019-10-30 | Stop reason: HOSPADM

## 2019-10-29 RX ADMIN — INSULIN GLARGINE 8 UNITS: 100 INJECTION, SOLUTION SUBCUTANEOUS at 21:26

## 2019-10-29 RX ADMIN — INSULIN LISPRO 8 UNITS: 100 INJECTION, SOLUTION INTRAVENOUS; SUBCUTANEOUS at 17:57

## 2019-10-30 VITALS
RESPIRATION RATE: 20 BRPM | TEMPERATURE: 98.4 F | DIASTOLIC BLOOD PRESSURE: 68 MMHG | HEART RATE: 95 BPM | SYSTOLIC BLOOD PRESSURE: 119 MMHG

## 2019-10-30 DIAGNOSIS — O24.414 INSULIN CONTROLLED GESTATIONAL DIABETES MELLITUS (GDM) IN THIRD TRIMESTER: Primary | ICD-10-CM

## 2019-10-30 LAB
METER GLUCOSE: 146 MG/DL (ref 74–99)
URINE CULTURE, ROUTINE: NORMAL

## 2019-10-30 PROCEDURE — 76819 FETAL BIOPHYS PROFIL W/O NST: CPT | Performed by: OBSTETRICS & GYNECOLOGY

## 2019-10-30 PROCEDURE — 99214 OFFICE O/P EST MOD 30 MIN: CPT | Performed by: OBSTETRICS & GYNECOLOGY

## 2019-10-30 PROCEDURE — 76819 FETAL BIOPHYS PROFIL W/O NST: CPT

## 2019-10-30 PROCEDURE — 6370000000 HC RX 637 (ALT 250 FOR IP)

## 2019-10-30 PROCEDURE — 99212 OFFICE O/P EST SF 10 MIN: CPT

## 2019-10-30 PROCEDURE — 82962 GLUCOSE BLOOD TEST: CPT

## 2019-10-30 PROCEDURE — 6370000000 HC RX 637 (ALT 250 FOR IP): Performed by: OBSTETRICS & GYNECOLOGY

## 2019-10-30 RX ORDER — ACETAMINOPHEN 325 MG/1
650 TABLET ORAL EVERY 4 HOURS PRN
Status: DISCONTINUED | OUTPATIENT
Start: 2019-10-30 | End: 2019-10-30 | Stop reason: HOSPADM

## 2019-10-30 RX ORDER — ACETAMINOPHEN 325 MG/1
TABLET ORAL
Status: COMPLETED
Start: 2019-10-30 | End: 2019-10-30

## 2019-10-30 RX ADMIN — ACETAMINOPHEN 650 MG: 325 TABLET ORAL at 00:24

## 2019-10-30 RX ADMIN — INSULIN LISPRO 8 UNITS: 100 INJECTION, SOLUTION INTRAVENOUS; SUBCUTANEOUS at 08:57

## 2019-10-30 ASSESSMENT — PAIN SCALES - GENERAL: PAINLEVEL_OUTOF10: 6

## 2019-11-06 ENCOUNTER — ROUTINE PRENATAL (OUTPATIENT)
Dept: OBGYN CLINIC | Age: 34
End: 2019-11-06
Payer: COMMERCIAL

## 2019-11-06 VITALS
SYSTOLIC BLOOD PRESSURE: 129 MMHG | DIASTOLIC BLOOD PRESSURE: 72 MMHG | WEIGHT: 217 LBS | BODY MASS INDEX: 38.44 KG/M2 | HEART RATE: 118 BPM

## 2019-11-06 DIAGNOSIS — O36.63X0 EXCESSIVE FETAL GROWTH AFFECTING MANAGEMENT OF MOTHER IN SINGLETON PREGNANCY IN THIRD TRIMESTER, ANTEPARTUM: ICD-10-CM

## 2019-11-06 DIAGNOSIS — O24.414 INSULIN CONTROLLED GESTATIONAL DIABETES MELLITUS (GDM) IN THIRD TRIMESTER: Primary | ICD-10-CM

## 2019-11-06 DIAGNOSIS — O09.523 ELDERLY MULTIGRAVIDA, THIRD TRIMESTER: ICD-10-CM

## 2019-11-06 DIAGNOSIS — O99.213 OBESITY AFFECTING PREGNANCY IN THIRD TRIMESTER: ICD-10-CM

## 2019-11-06 DIAGNOSIS — Z3A.32 32 WEEKS GESTATION OF PREGNANCY: ICD-10-CM

## 2019-11-06 DIAGNOSIS — Z91.199 PROBLEM WITH MEDICAL CARE COMPLIANCE: ICD-10-CM

## 2019-11-06 DIAGNOSIS — O09.213 PREVIOUS PRETERM DELIVERY IN THIRD TRIMESTER, ANTEPARTUM: ICD-10-CM

## 2019-11-06 PROCEDURE — 76819 FETAL BIOPHYS PROFIL W/O NST: CPT | Performed by: OBSTETRICS & GYNECOLOGY

## 2019-11-06 PROCEDURE — 99211 OFF/OP EST MAY X REQ PHY/QHP: CPT | Performed by: OBSTETRICS & GYNECOLOGY

## 2019-11-06 PROCEDURE — 81002 URINALYSIS NONAUTO W/O SCOPE: CPT | Performed by: OBSTETRICS & GYNECOLOGY

## 2019-11-06 PROCEDURE — 99213 OFFICE O/P EST LOW 20 MIN: CPT | Performed by: OBSTETRICS & GYNECOLOGY

## 2019-11-06 PROCEDURE — G8417 CALC BMI ABV UP PARAM F/U: HCPCS | Performed by: OBSTETRICS & GYNECOLOGY

## 2019-11-06 PROCEDURE — 76816 OB US FOLLOW-UP PER FETUS: CPT | Performed by: OBSTETRICS & GYNECOLOGY

## 2019-11-06 PROCEDURE — G8427 DOCREV CUR MEDS BY ELIG CLIN: HCPCS | Performed by: OBSTETRICS & GYNECOLOGY

## 2019-11-06 PROCEDURE — 1036F TOBACCO NON-USER: CPT | Performed by: OBSTETRICS & GYNECOLOGY

## 2019-11-06 PROCEDURE — G8484 FLU IMMUNIZE NO ADMIN: HCPCS | Performed by: OBSTETRICS & GYNECOLOGY

## 2019-11-12 ENCOUNTER — HOSPITAL ENCOUNTER (OUTPATIENT)
Age: 34
Discharge: HOME | End: 2019-11-12
Attending: OBSTETRICS & GYNECOLOGY | Admitting: OBSTETRICS & GYNECOLOGY
Payer: COMMERCIAL

## 2019-11-12 VITALS
BODY MASS INDEX: 38.8 KG/M2 | HEIGHT: 63 IN | HEART RATE: 102 BPM | TEMPERATURE: 97.2 F | DIASTOLIC BLOOD PRESSURE: 60 MMHG | RESPIRATION RATE: 16 BRPM | SYSTOLIC BLOOD PRESSURE: 112 MMHG | WEIGHT: 219 LBS

## 2019-11-12 PROBLEM — Z3A.33 33 WEEKS GESTATION OF PREGNANCY: Status: ACTIVE | Noted: 2019-11-12

## 2019-11-12 LAB
AMPHETAMINE SCREEN, URINE: NOT DETECTED
BACTERIA: ABNORMAL /HPF
BARBITURATE SCREEN URINE: NOT DETECTED
BENZODIAZEPINE SCREEN, URINE: NOT DETECTED
BILIRUBIN URINE: NEGATIVE
BLOOD, URINE: ABNORMAL
CANNABINOID SCREEN URINE: NOT DETECTED
CLARITY: CLEAR
COCAINE METABOLITE SCREEN URINE: NOT DETECTED
COLOR: YELLOW
EPITHELIAL CELLS, UA: ABNORMAL /HPF
FENTANYL SCREEN, URINE: NOT DETECTED
FETAL FIBRONECTIN: NEGATIVE
GLUCOSE URINE: 500 MG/DL
KETONES, URINE: 40 MG/DL
LEUKOCYTE ESTERASE, URINE: NEGATIVE
Lab: NORMAL
METHADONE SCREEN, URINE: NOT DETECTED
NITRITE, URINE: NEGATIVE
OPIATE SCREEN URINE: NOT DETECTED
OXYCODONE URINE: NOT DETECTED
PH UA: 5.5 (ref 5–9)
PHENCYCLIDINE SCREEN URINE: NOT DETECTED
PROTEIN UA: ABNORMAL MG/DL
RBC UA: ABNORMAL /HPF (ref 0–2)
SPECIFIC GRAVITY UA: >=1.03 (ref 1–1.03)
UROBILINOGEN, URINE: 0.2 E.U./DL
WBC UA: ABNORMAL /HPF (ref 0–5)

## 2019-11-12 PROCEDURE — 99212 OFFICE O/P EST SF 10 MIN: CPT

## 2019-11-12 PROCEDURE — 81001 URINALYSIS AUTO W/SCOPE: CPT

## 2019-11-12 PROCEDURE — 82731 ASSAY OF FETAL FIBRONECTIN: CPT

## 2019-11-12 PROCEDURE — 80307 DRUG TEST PRSMV CHEM ANLYZR: CPT

## 2019-11-12 RX ORDER — SODIUM CHLORIDE, SODIUM LACTATE, POTASSIUM CHLORIDE, CALCIUM CHLORIDE 600; 310; 30; 20 MG/100ML; MG/100ML; MG/100ML; MG/100ML
INJECTION, SOLUTION INTRAVENOUS CONTINUOUS
Status: DISCONTINUED | OUTPATIENT
Start: 2019-11-12 | End: 2019-11-13 | Stop reason: HOSPADM

## 2019-11-15 ENCOUNTER — TELEPHONE (OUTPATIENT)
Dept: OBGYN CLINIC | Age: 34
End: 2019-11-15

## 2019-11-27 ENCOUNTER — ROUTINE PRENATAL (OUTPATIENT)
Dept: OBGYN CLINIC | Age: 34
End: 2019-11-27
Payer: COMMERCIAL

## 2019-11-27 VITALS
HEIGHT: 63 IN | DIASTOLIC BLOOD PRESSURE: 80 MMHG | WEIGHT: 224 LBS | HEART RATE: 107 BPM | BODY MASS INDEX: 39.69 KG/M2 | SYSTOLIC BLOOD PRESSURE: 132 MMHG

## 2019-11-27 DIAGNOSIS — O40.3XX0 POLYHYDRAMNIOS, THIRD TRIMESTER, NOT APPLICABLE OR UNSPECIFIED: ICD-10-CM

## 2019-11-27 DIAGNOSIS — O24.414 INSULIN CONTROLLED GESTATIONAL DIABETES MELLITUS (GDM) IN THIRD TRIMESTER: Primary | ICD-10-CM

## 2019-11-27 DIAGNOSIS — O09.523 ELDERLY MULTIGRAVIDA, THIRD TRIMESTER: ICD-10-CM

## 2019-11-27 DIAGNOSIS — O36.63X0 EXCESSIVE FETAL GROWTH AFFECTING MANAGEMENT OF MOTHER IN SINGLETON PREGNANCY IN THIRD TRIMESTER, ANTEPARTUM: ICD-10-CM

## 2019-11-27 DIAGNOSIS — O99.213 OBESITY AFFECTING PREGNANCY IN THIRD TRIMESTER: ICD-10-CM

## 2019-11-27 DIAGNOSIS — Z3A.35 35 WEEKS GESTATION OF PREGNANCY: ICD-10-CM

## 2019-11-27 DIAGNOSIS — O09.213 PREVIOUS PRETERM DELIVERY IN THIRD TRIMESTER, ANTEPARTUM: ICD-10-CM

## 2019-11-27 LAB
GLUCOSE URINE, POC: NEGATIVE
PROTEIN UA: ABNORMAL

## 2019-11-27 PROCEDURE — 81002 URINALYSIS NONAUTO W/O SCOPE: CPT | Performed by: OBSTETRICS & GYNECOLOGY

## 2019-11-27 PROCEDURE — G8427 DOCREV CUR MEDS BY ELIG CLIN: HCPCS | Performed by: OBSTETRICS & GYNECOLOGY

## 2019-11-27 PROCEDURE — G8417 CALC BMI ABV UP PARAM F/U: HCPCS | Performed by: OBSTETRICS & GYNECOLOGY

## 2019-11-27 PROCEDURE — 99211 OFF/OP EST MAY X REQ PHY/QHP: CPT | Performed by: OBSTETRICS & GYNECOLOGY

## 2019-11-27 PROCEDURE — 76820 UMBILICAL ARTERY ECHO: CPT | Performed by: OBSTETRICS & GYNECOLOGY

## 2019-11-27 PROCEDURE — 76818 FETAL BIOPHYS PROFILE W/NST: CPT | Performed by: OBSTETRICS & GYNECOLOGY

## 2019-11-27 PROCEDURE — 99214 OFFICE O/P EST MOD 30 MIN: CPT | Performed by: OBSTETRICS & GYNECOLOGY

## 2019-11-27 PROCEDURE — G8484 FLU IMMUNIZE NO ADMIN: HCPCS | Performed by: OBSTETRICS & GYNECOLOGY

## 2019-11-27 PROCEDURE — 76816 OB US FOLLOW-UP PER FETUS: CPT | Performed by: OBSTETRICS & GYNECOLOGY

## 2019-11-27 PROCEDURE — 1036F TOBACCO NON-USER: CPT | Performed by: OBSTETRICS & GYNECOLOGY

## 2019-12-04 ENCOUNTER — ROUTINE PRENATAL (OUTPATIENT)
Dept: OBGYN CLINIC | Age: 34
End: 2019-12-04
Payer: COMMERCIAL

## 2019-12-04 VITALS
HEIGHT: 63 IN | BODY MASS INDEX: 40.22 KG/M2 | DIASTOLIC BLOOD PRESSURE: 75 MMHG | SYSTOLIC BLOOD PRESSURE: 119 MMHG | HEART RATE: 102 BPM | WEIGHT: 227 LBS

## 2019-12-04 DIAGNOSIS — O99.213 OBESITY AFFECTING PREGNANCY IN THIRD TRIMESTER: ICD-10-CM

## 2019-12-04 DIAGNOSIS — Z3A.36 36 WEEKS GESTATION OF PREGNANCY: ICD-10-CM

## 2019-12-04 DIAGNOSIS — O09.523 ELDERLY MULTIGRAVIDA, THIRD TRIMESTER: ICD-10-CM

## 2019-12-04 DIAGNOSIS — O24.414 INSULIN CONTROLLED GESTATIONAL DIABETES MELLITUS (GDM) IN THIRD TRIMESTER: Primary | ICD-10-CM

## 2019-12-04 DIAGNOSIS — O40.3XX0 POLYHYDRAMNIOS IN THIRD TRIMESTER COMPLICATION, SINGLE OR UNSPECIFIED FETUS: ICD-10-CM

## 2019-12-04 DIAGNOSIS — O36.63X0 EXCESSIVE FETAL GROWTH AFFECTING MANAGEMENT OF MOTHER IN SINGLETON PREGNANCY IN THIRD TRIMESTER, ANTEPARTUM: ICD-10-CM

## 2019-12-04 LAB
GLUCOSE URINE, POC: NEGATIVE
PROTEIN UA: NEGATIVE

## 2019-12-04 PROCEDURE — 99211 OFF/OP EST MAY X REQ PHY/QHP: CPT | Performed by: OBSTETRICS & GYNECOLOGY

## 2019-12-04 PROCEDURE — G8484 FLU IMMUNIZE NO ADMIN: HCPCS | Performed by: OBSTETRICS & GYNECOLOGY

## 2019-12-04 PROCEDURE — G8417 CALC BMI ABV UP PARAM F/U: HCPCS | Performed by: OBSTETRICS & GYNECOLOGY

## 2019-12-04 PROCEDURE — G8427 DOCREV CUR MEDS BY ELIG CLIN: HCPCS | Performed by: OBSTETRICS & GYNECOLOGY

## 2019-12-04 PROCEDURE — 76818 FETAL BIOPHYS PROFILE W/NST: CPT | Performed by: OBSTETRICS & GYNECOLOGY

## 2019-12-04 PROCEDURE — 1036F TOBACCO NON-USER: CPT | Performed by: OBSTETRICS & GYNECOLOGY

## 2019-12-04 PROCEDURE — 81002 URINALYSIS NONAUTO W/O SCOPE: CPT | Performed by: OBSTETRICS & GYNECOLOGY

## 2019-12-04 PROCEDURE — 99214 OFFICE O/P EST MOD 30 MIN: CPT | Performed by: OBSTETRICS & GYNECOLOGY

## 2019-12-05 ENCOUNTER — TELEPHONE (OUTPATIENT)
Dept: OBGYN CLINIC | Age: 34
End: 2019-12-05

## 2019-12-08 ENCOUNTER — ANESTHESIA EVENT (OUTPATIENT)
Dept: LABOR AND DELIVERY | Age: 34
DRG: 540 | End: 2019-12-08
Payer: COMMERCIAL

## 2019-12-08 ENCOUNTER — ANESTHESIA (OUTPATIENT)
Dept: LABOR AND DELIVERY | Age: 34
DRG: 540 | End: 2019-12-08
Payer: COMMERCIAL

## 2019-12-08 ENCOUNTER — HOSPITAL ENCOUNTER (INPATIENT)
Dept: LABOR AND DELIVERY | Age: 34
LOS: 4 days | Discharge: HOME | DRG: 540 | End: 2019-12-12
Attending: OBSTETRICS & GYNECOLOGY | Admitting: OBSTETRICS & GYNECOLOGY
Payer: COMMERCIAL

## 2019-12-08 VITALS — SYSTOLIC BLOOD PRESSURE: 119 MMHG | OXYGEN SATURATION: 100 % | DIASTOLIC BLOOD PRESSURE: 58 MMHG

## 2019-12-08 DIAGNOSIS — G89.18 ACUTE POSTOPERATIVE PAIN OF ABDOMEN: Primary | ICD-10-CM

## 2019-12-08 DIAGNOSIS — R10.9 ACUTE POSTOPERATIVE PAIN OF ABDOMEN: Primary | ICD-10-CM

## 2019-12-08 PROBLEM — Z34.93 THIRD TRIMESTER PREGNANCY AT LESS THAN 36 WEEKS: Status: ACTIVE | Noted: 2019-12-08

## 2019-12-08 PROBLEM — Z3A.36 36 WEEKS GESTATION OF PREGNANCY: Status: ACTIVE | Noted: 2019-12-08

## 2019-12-08 LAB
ABO/RH: NORMAL
AMPHETAMINE SCREEN, URINE: NOT DETECTED
ANTIBODY SCREEN: NORMAL
BARBITURATE SCREEN URINE: NOT DETECTED
BENZODIAZEPINE SCREEN, URINE: NOT DETECTED
CANNABINOID SCREEN URINE: NOT DETECTED
COCAINE METABOLITE SCREEN URINE: NOT DETECTED
FENTANYL SCREEN, URINE: NOT DETECTED
HCT VFR BLD CALC: 31.6 % (ref 34–48)
HEMOGLOBIN: 9.7 G/DL (ref 11.5–15.5)
Lab: NORMAL
MCH RBC QN AUTO: 24.9 PG (ref 26–35)
MCHC RBC AUTO-ENTMCNC: 30.7 % (ref 32–34.5)
MCV RBC AUTO: 81 FL (ref 80–99.9)
METER GLUCOSE: 74 MG/DL (ref 74–99)
METHADONE SCREEN, URINE: NOT DETECTED
OPIATE SCREEN URINE: NOT DETECTED
OXYCODONE URINE: NOT DETECTED
PDW BLD-RTO: 15.5 FL (ref 11.5–15)
PHENCYCLIDINE SCREEN URINE: NOT DETECTED
PLATELET # BLD: 237 E9/L (ref 130–450)
PMV BLD AUTO: 11.8 FL (ref 7–12)
RBC # BLD: 3.9 E12/L (ref 3.5–5.5)
WBC # BLD: 11 E9/L (ref 4.5–11.5)

## 2019-12-08 PROCEDURE — 3700000000 HC ANESTHESIA ATTENDED CARE: Performed by: OBSTETRICS & GYNECOLOGY

## 2019-12-08 PROCEDURE — 7100000001 HC PACU RECOVERY - ADDTL 15 MIN: Performed by: OBSTETRICS & GYNECOLOGY

## 2019-12-08 PROCEDURE — 80307 DRUG TEST PRSMV CHEM ANLYZR: CPT

## 2019-12-08 PROCEDURE — 85027 COMPLETE CBC AUTOMATED: CPT

## 2019-12-08 PROCEDURE — 3609079900 HC CESAREAN SECTION: Performed by: OBSTETRICS & GYNECOLOGY

## 2019-12-08 PROCEDURE — 6360000002 HC RX W HCPCS: Performed by: ANESTHESIOLOGY

## 2019-12-08 PROCEDURE — 2500000003 HC RX 250 WO HCPCS: Performed by: NURSE ANESTHETIST, CERTIFIED REGISTERED

## 2019-12-08 PROCEDURE — 6370000000 HC RX 637 (ALT 250 FOR IP): Performed by: ANESTHESIOLOGY

## 2019-12-08 PROCEDURE — 1220000000 HC SEMI PRIVATE OB R&B

## 2019-12-08 PROCEDURE — 6360000002 HC RX W HCPCS: Performed by: OBSTETRICS & GYNECOLOGY

## 2019-12-08 PROCEDURE — 7100000000 HC PACU RECOVERY - FIRST 15 MIN: Performed by: OBSTETRICS & GYNECOLOGY

## 2019-12-08 PROCEDURE — 6370000000 HC RX 637 (ALT 250 FOR IP): Performed by: OBSTETRICS & GYNECOLOGY

## 2019-12-08 PROCEDURE — 2780000010 HC IMPLANT OTHER: Performed by: OBSTETRICS & GYNECOLOGY

## 2019-12-08 PROCEDURE — 86901 BLOOD TYPING SEROLOGIC RH(D): CPT

## 2019-12-08 PROCEDURE — 2709999900 HC NON-CHARGEABLE SUPPLY: Performed by: OBSTETRICS & GYNECOLOGY

## 2019-12-08 PROCEDURE — 88307 TISSUE EXAM BY PATHOLOGIST: CPT

## 2019-12-08 PROCEDURE — 2580000003 HC RX 258: Performed by: OBSTETRICS & GYNECOLOGY

## 2019-12-08 PROCEDURE — P9016 RBC LEUKOCYTES REDUCED: HCPCS

## 2019-12-08 PROCEDURE — 2580000003 HC RX 258: Performed by: NURSE ANESTHETIST, CERTIFIED REGISTERED

## 2019-12-08 PROCEDURE — 59025 FETAL NON-STRESS TEST: CPT | Performed by: OBSTETRICS & GYNECOLOGY

## 2019-12-08 PROCEDURE — 86850 RBC ANTIBODY SCREEN: CPT

## 2019-12-08 PROCEDURE — 2500000003 HC RX 250 WO HCPCS: Performed by: OBSTETRICS & GYNECOLOGY

## 2019-12-08 PROCEDURE — C1765 ADHESION BARRIER: HCPCS | Performed by: OBSTETRICS & GYNECOLOGY

## 2019-12-08 PROCEDURE — 6360000002 HC RX W HCPCS: Performed by: NURSE ANESTHETIST, CERTIFIED REGISTERED

## 2019-12-08 PROCEDURE — 86900 BLOOD TYPING SEROLOGIC ABO: CPT

## 2019-12-08 PROCEDURE — 36415 COLL VENOUS BLD VENIPUNCTURE: CPT

## 2019-12-08 PROCEDURE — 3700000001 HC ADD 15 MINUTES (ANESTHESIA): Performed by: OBSTETRICS & GYNECOLOGY

## 2019-12-08 PROCEDURE — 86923 COMPATIBILITY TEST ELECTRIC: CPT

## 2019-12-08 PROCEDURE — 82962 GLUCOSE BLOOD TEST: CPT

## 2019-12-08 DEVICE — BARRIER ADH W3XL4IN UTER PELV ABSRB GYNECARE INTCEED: Type: IMPLANTABLE DEVICE | Site: UTERUS | Status: FUNCTIONAL

## 2019-12-08 RX ORDER — ONDANSETRON 2 MG/ML
4 INJECTION INTRAMUSCULAR; INTRAVENOUS EVERY 6 HOURS PRN
Status: DISCONTINUED | OUTPATIENT
Start: 2019-12-08 | End: 2019-12-12 | Stop reason: HOSPADM

## 2019-12-08 RX ORDER — SODIUM CHLORIDE, SODIUM LACTATE, POTASSIUM CHLORIDE, CALCIUM CHLORIDE 600; 310; 30; 20 MG/100ML; MG/100ML; MG/100ML; MG/100ML
INJECTION, SOLUTION INTRAVENOUS CONTINUOUS
Status: DISCONTINUED | OUTPATIENT
Start: 2019-12-08 | End: 2019-12-08 | Stop reason: SDUPTHER

## 2019-12-08 RX ORDER — NALOXONE HYDROCHLORIDE 0.4 MG/ML
0.4 INJECTION, SOLUTION INTRAMUSCULAR; INTRAVENOUS; SUBCUTANEOUS PRN
Status: DISCONTINUED | OUTPATIENT
Start: 2019-12-08 | End: 2019-12-12 | Stop reason: HOSPADM

## 2019-12-08 RX ORDER — METHYLERGONOVINE MALEATE 0.2 MG/ML
200 INJECTION INTRAVENOUS PRN
Status: DISCONTINUED | OUTPATIENT
Start: 2019-12-08 | End: 2019-12-12 | Stop reason: HOSPADM

## 2019-12-08 RX ORDER — SODIUM CHLORIDE 0.9 % (FLUSH) 0.9 %
10 SYRINGE (ML) INJECTION PRN
Status: DISCONTINUED | OUTPATIENT
Start: 2019-12-08 | End: 2019-12-12 | Stop reason: HOSPADM

## 2019-12-08 RX ORDER — DOCUSATE SODIUM 100 MG/1
100 CAPSULE, LIQUID FILLED ORAL 2 TIMES DAILY
Status: DISCONTINUED | OUTPATIENT
Start: 2019-12-08 | End: 2019-12-12 | Stop reason: HOSPADM

## 2019-12-08 RX ORDER — PRENATAL WITH FERROUS FUM AND FOLIC ACID 3080; 920; 120; 400; 22; 1.84; 3; 20; 10; 1; 12; 200; 27; 25; 2 [IU]/1; [IU]/1; MG/1; [IU]/1; MG/1; MG/1; MG/1; MG/1; MG/1; MG/1; UG/1; MG/1; MG/1; MG/1; MG/1
1 TABLET ORAL DAILY
Status: DISCONTINUED | OUTPATIENT
Start: 2019-12-08 | End: 2019-12-12 | Stop reason: HOSPADM

## 2019-12-08 RX ORDER — OXYCODONE HYDROCHLORIDE AND ACETAMINOPHEN 5; 325 MG/1; MG/1
1 TABLET ORAL EVERY 4 HOURS PRN
Status: DISPENSED | OUTPATIENT
Start: 2019-12-08 | End: 2019-12-09

## 2019-12-08 RX ORDER — SODIUM CHLORIDE 0.9 % (FLUSH) 0.9 %
10 SYRINGE (ML) INJECTION EVERY 12 HOURS SCHEDULED
Status: DISCONTINUED | OUTPATIENT
Start: 2019-12-08 | End: 2019-12-12 | Stop reason: HOSPADM

## 2019-12-08 RX ORDER — MORPHINE SULFATE 1 MG/ML
INJECTION, SOLUTION EPIDURAL; INTRATHECAL; INTRAVENOUS PRN
Status: DISCONTINUED | OUTPATIENT
Start: 2019-12-08 | End: 2019-12-08 | Stop reason: SDUPTHER

## 2019-12-08 RX ORDER — SODIUM CHLORIDE, SODIUM LACTATE, POTASSIUM CHLORIDE, AND CALCIUM CHLORIDE .6; .31; .03; .02 G/100ML; G/100ML; G/100ML; G/100ML
500 INJECTION, SOLUTION INTRAVENOUS ONCE
Status: COMPLETED | OUTPATIENT
Start: 2019-12-08 | End: 2019-12-08

## 2019-12-08 RX ORDER — SODIUM CHLORIDE, SODIUM LACTATE, POTASSIUM CHLORIDE, AND CALCIUM CHLORIDE .6; .31; .03; .02 G/100ML; G/100ML; G/100ML; G/100ML
1000 INJECTION, SOLUTION INTRAVENOUS ONCE
Status: DISCONTINUED | OUTPATIENT
Start: 2019-12-08 | End: 2019-12-12 | Stop reason: HOSPADM

## 2019-12-08 RX ORDER — OXYCODONE HYDROCHLORIDE AND ACETAMINOPHEN 5; 325 MG/1; MG/1
1 TABLET ORAL EVERY 4 HOURS PRN
Status: DISCONTINUED | OUTPATIENT
Start: 2019-12-08 | End: 2019-12-12 | Stop reason: HOSPADM

## 2019-12-08 RX ORDER — SODIUM CHLORIDE 0.9 % (FLUSH) 0.9 %
10 SYRINGE (ML) INJECTION EVERY 12 HOURS SCHEDULED
Status: DISCONTINUED | OUTPATIENT
Start: 2019-12-08 | End: 2019-12-08 | Stop reason: SDUPTHER

## 2019-12-08 RX ORDER — NALBUPHINE HCL 10 MG/ML
5 AMPUL (ML) INJECTION EVERY 4 HOURS PRN
Status: ACTIVE | OUTPATIENT
Start: 2019-12-08 | End: 2019-12-09

## 2019-12-08 RX ORDER — OXYCODONE HYDROCHLORIDE AND ACETAMINOPHEN 5; 325 MG/1; MG/1
2 TABLET ORAL EVERY 4 HOURS PRN
Status: DISCONTINUED | OUTPATIENT
Start: 2019-12-08 | End: 2019-12-12 | Stop reason: HOSPADM

## 2019-12-08 RX ORDER — METHYLERGONOVINE MALEATE 0.2 MG/ML
INJECTION INTRAVENOUS PRN
Status: DISCONTINUED | OUTPATIENT
Start: 2019-12-08 | End: 2019-12-08

## 2019-12-08 RX ORDER — KETOROLAC TROMETHAMINE 30 MG/ML
15 INJECTION, SOLUTION INTRAMUSCULAR; INTRAVENOUS EVERY 6 HOURS
Status: COMPLETED | OUTPATIENT
Start: 2019-12-08 | End: 2019-12-09

## 2019-12-08 RX ORDER — CARBOPROST TROMETHAMINE 250 UG/ML
250 INJECTION, SOLUTION INTRAMUSCULAR PRN
Status: DISCONTINUED | OUTPATIENT
Start: 2019-12-08 | End: 2019-12-12 | Stop reason: HOSPADM

## 2019-12-08 RX ORDER — ONDANSETRON 2 MG/ML
4 INJECTION INTRAMUSCULAR; INTRAVENOUS EVERY 6 HOURS PRN
Status: ACTIVE | OUTPATIENT
Start: 2019-12-08 | End: 2019-12-09

## 2019-12-08 RX ORDER — CLINDAMYCIN PHOSPHATE 900 MG/50ML
900 INJECTION INTRAVENOUS ONCE
Status: COMPLETED | OUTPATIENT
Start: 2019-12-08 | End: 2019-12-08

## 2019-12-08 RX ORDER — LANOLIN 100 %
OINTMENT (GRAM) TOPICAL
Status: DISCONTINUED | OUTPATIENT
Start: 2019-12-08 | End: 2019-12-12 | Stop reason: HOSPADM

## 2019-12-08 RX ORDER — TRISODIUM CITRATE DIHYDRATE AND CITRIC ACID MONOHYDRATE 500; 334 MG/5ML; MG/5ML
30 SOLUTION ORAL ONCE
Status: COMPLETED | OUTPATIENT
Start: 2019-12-08 | End: 2019-12-08

## 2019-12-08 RX ORDER — DIPHENHYDRAMINE HYDROCHLORIDE 50 MG/ML
25 INJECTION INTRAMUSCULAR; INTRAVENOUS EVERY 6 HOURS PRN
Status: DISCONTINUED | OUTPATIENT
Start: 2019-12-08 | End: 2019-12-12 | Stop reason: HOSPADM

## 2019-12-08 RX ORDER — BUPIVACAINE HYDROCHLORIDE 7.5 MG/ML
INJECTION, SOLUTION INTRASPINAL PRN
Status: DISCONTINUED | OUTPATIENT
Start: 2019-12-08 | End: 2019-12-08 | Stop reason: SDUPTHER

## 2019-12-08 RX ORDER — SODIUM CHLORIDE, SODIUM LACTATE, POTASSIUM CHLORIDE, CALCIUM CHLORIDE 600; 310; 30; 20 MG/100ML; MG/100ML; MG/100ML; MG/100ML
INJECTION, SOLUTION INTRAVENOUS CONTINUOUS PRN
Status: DISCONTINUED | OUTPATIENT
Start: 2019-12-08 | End: 2019-12-08 | Stop reason: SDUPTHER

## 2019-12-08 RX ORDER — ACETAMINOPHEN 325 MG/1
650 TABLET ORAL EVERY 4 HOURS PRN
Status: DISCONTINUED | OUTPATIENT
Start: 2019-12-08 | End: 2019-12-12 | Stop reason: HOSPADM

## 2019-12-08 RX ORDER — SODIUM CHLORIDE, SODIUM LACTATE, POTASSIUM CHLORIDE, CALCIUM CHLORIDE 600; 310; 30; 20 MG/100ML; MG/100ML; MG/100ML; MG/100ML
INJECTION, SOLUTION INTRAVENOUS CONTINUOUS
Status: DISCONTINUED | OUTPATIENT
Start: 2019-12-08 | End: 2019-12-12 | Stop reason: HOSPADM

## 2019-12-08 RX ORDER — DIPHENHYDRAMINE HYDROCHLORIDE 50 MG/ML
25 INJECTION INTRAMUSCULAR; INTRAVENOUS EVERY 6 HOURS PRN
Status: DISPENSED | OUTPATIENT
Start: 2019-12-08 | End: 2019-12-09

## 2019-12-08 RX ORDER — FERROUS SULFATE 325(65) MG
325 TABLET ORAL 2 TIMES DAILY WITH MEALS
Status: DISCONTINUED | OUTPATIENT
Start: 2019-12-08 | End: 2019-12-12 | Stop reason: HOSPADM

## 2019-12-08 RX ORDER — SODIUM CHLORIDE 0.9 % (FLUSH) 0.9 %
10 SYRINGE (ML) INJECTION PRN
Status: DISCONTINUED | OUTPATIENT
Start: 2019-12-08 | End: 2019-12-08 | Stop reason: SDUPTHER

## 2019-12-08 RX ORDER — ONDANSETRON 2 MG/ML
INJECTION INTRAMUSCULAR; INTRAVENOUS PRN
Status: DISCONTINUED | OUTPATIENT
Start: 2019-12-08 | End: 2019-12-08

## 2019-12-08 RX ORDER — PROMETHAZINE HYDROCHLORIDE 25 MG/ML
INJECTION, SOLUTION INTRAMUSCULAR; INTRAVENOUS PRN
Status: DISCONTINUED | OUTPATIENT
Start: 2019-12-08 | End: 2019-12-08 | Stop reason: SDUPTHER

## 2019-12-08 RX ORDER — METHYLERGONOVINE MALEATE 0.2 MG/ML
INJECTION INTRAVENOUS
Status: COMPLETED
Start: 2019-12-08 | End: 2019-12-08

## 2019-12-08 RX ORDER — IBUPROFEN 800 MG/1
800 TABLET ORAL EVERY 8 HOURS
Status: DISCONTINUED | OUTPATIENT
Start: 2019-12-09 | End: 2019-12-12 | Stop reason: HOSPADM

## 2019-12-08 RX ADMIN — PROMETHAZINE HYDROCHLORIDE 12.5 MG: 25 INJECTION INTRAMUSCULAR; INTRAVENOUS at 15:53

## 2019-12-08 RX ADMIN — ONDANSETRON HYDROCHLORIDE 4 MG: 2 INJECTION, SOLUTION INTRAMUSCULAR; INTRAVENOUS at 15:32

## 2019-12-08 RX ADMIN — MORPHINE SULFATE 0.15 MG: 1 INJECTION, SOLUTION EPIDURAL; INTRATHECAL; INTRAVENOUS at 15:15

## 2019-12-08 RX ADMIN — SODIUM CHLORIDE, POTASSIUM CHLORIDE, SODIUM LACTATE AND CALCIUM CHLORIDE: 600; 310; 30; 20 INJECTION, SOLUTION INTRAVENOUS at 12:59

## 2019-12-08 RX ADMIN — PHENYLEPHRINE HYDROCHLORIDE 100 MCG: 10 INJECTION INTRAVENOUS at 15:18

## 2019-12-08 RX ADMIN — METHYLERGONOVINE MALEATE 200 MCG: 0.2 INJECTION, SOLUTION INTRAMUSCULAR; INTRAVENOUS at 15:45

## 2019-12-08 RX ADMIN — KETOROLAC TROMETHAMINE 15 MG: 30 INJECTION, SOLUTION INTRAMUSCULAR at 18:27

## 2019-12-08 RX ADMIN — OXYCODONE HYDROCHLORIDE AND ACETAMINOPHEN 1 TABLET: 5; 325 TABLET ORAL at 20:26

## 2019-12-08 RX ADMIN — PHENYLEPHRINE HYDROCHLORIDE 100 MCG: 10 INJECTION INTRAVENOUS at 15:24

## 2019-12-08 RX ADMIN — PHENYLEPHRINE HYDROCHLORIDE 200 MCG: 10 INJECTION INTRAVENOUS at 15:31

## 2019-12-08 RX ADMIN — DIPHENHYDRAMINE HYDROCHLORIDE 25 MG: 50 INJECTION, SOLUTION INTRAMUSCULAR; INTRAVENOUS at 22:01

## 2019-12-08 RX ADMIN — BUPIVACAINE HYDROCHLORIDE IN DEXTROSE 1.6 ML: 7.5 INJECTION, SOLUTION SUBARACHNOID at 15:15

## 2019-12-08 RX ADMIN — Medication 999 ML/HR: at 15:31

## 2019-12-08 RX ADMIN — SODIUM CHLORIDE, POTASSIUM CHLORIDE, SODIUM LACTATE AND CALCIUM CHLORIDE: 600; 310; 30; 20 INJECTION, SOLUTION INTRAVENOUS at 16:03

## 2019-12-08 RX ADMIN — CLINDAMYCIN PHOSPHATE 900 MG: 900 INJECTION, SOLUTION INTRAVENOUS at 14:43

## 2019-12-08 RX ADMIN — SODIUM CHLORIDE, POTASSIUM CHLORIDE, SODIUM LACTATE AND CALCIUM CHLORIDE 500 ML: 600; 310; 30; 20 INJECTION, SOLUTION INTRAVENOUS at 10:55

## 2019-12-08 RX ADMIN — DOCUSATE SODIUM 100 MG: 100 CAPSULE, LIQUID FILLED ORAL at 20:25

## 2019-12-08 RX ADMIN — SODIUM CITRATE AND CITRIC ACID MONOHYDRATE 30 ML: 500; 334 SOLUTION ORAL at 14:16

## 2019-12-08 RX ADMIN — PHENYLEPHRINE HYDROCHLORIDE 100 MCG: 10 INJECTION INTRAVENOUS at 15:41

## 2019-12-08 RX ADMIN — PHENYLEPHRINE HYDROCHLORIDE 100 MCG: 10 INJECTION INTRAVENOUS at 15:16

## 2019-12-08 RX ADMIN — PHENYLEPHRINE HYDROCHLORIDE 200 MCG: 10 INJECTION INTRAVENOUS at 15:49

## 2019-12-08 RX ADMIN — SODIUM CHLORIDE, POTASSIUM CHLORIDE, SODIUM LACTATE AND CALCIUM CHLORIDE: 600; 310; 30; 20 INJECTION, SOLUTION INTRAVENOUS at 13:45

## 2019-12-08 ASSESSMENT — PULMONARY FUNCTION TESTS
PIF_VALUE: 0

## 2019-12-08 ASSESSMENT — PAIN SCALES - GENERAL
PAINLEVEL_OUTOF10: 7
PAINLEVEL_OUTOF10: 7

## 2019-12-09 LAB
HCT VFR BLD CALC: 24 % (ref 34–48)
HEMOGLOBIN: 7.2 G/DL (ref 11.5–15.5)
MCH RBC QN AUTO: 24.7 PG (ref 26–35)
MCHC RBC AUTO-ENTMCNC: 30 % (ref 32–34.5)
MCV RBC AUTO: 82.2 FL (ref 80–99.9)
PDW BLD-RTO: 15.5 FL (ref 11.5–15)
PLATELET # BLD: 173 E9/L (ref 130–450)
PMV BLD AUTO: 10.9 FL (ref 7–12)
RBC # BLD: 2.92 E12/L (ref 3.5–5.5)
WBC # BLD: 11.5 E9/L (ref 4.5–11.5)

## 2019-12-09 PROCEDURE — 85027 COMPLETE CBC AUTOMATED: CPT

## 2019-12-09 PROCEDURE — 2580000003 HC RX 258: Performed by: OBSTETRICS & GYNECOLOGY

## 2019-12-09 PROCEDURE — 36415 COLL VENOUS BLD VENIPUNCTURE: CPT

## 2019-12-09 PROCEDURE — 6370000000 HC RX 637 (ALT 250 FOR IP): Performed by: ANESTHESIOLOGY

## 2019-12-09 PROCEDURE — 6370000000 HC RX 637 (ALT 250 FOR IP): Performed by: OBSTETRICS & GYNECOLOGY

## 2019-12-09 PROCEDURE — 6360000002 HC RX W HCPCS: Performed by: OBSTETRICS & GYNECOLOGY

## 2019-12-09 PROCEDURE — 1220000000 HC SEMI PRIVATE OB R&B

## 2019-12-09 PROCEDURE — 6360000002 HC RX W HCPCS: Performed by: ANESTHESIOLOGY

## 2019-12-09 RX ADMIN — FERROUS SULFATE TAB 325 MG (65 MG ELEMENTAL FE) 325 MG: 325 (65 FE) TAB at 08:56

## 2019-12-09 RX ADMIN — KETOROLAC TROMETHAMINE 15 MG: 30 INJECTION, SOLUTION INTRAMUSCULAR at 06:59

## 2019-12-09 RX ADMIN — OXYCODONE HYDROCHLORIDE AND ACETAMINOPHEN 1 TABLET: 5; 325 TABLET ORAL at 05:50

## 2019-12-09 RX ADMIN — OXYCODONE HYDROCHLORIDE AND ACETAMINOPHEN 1 TABLET: 5; 325 TABLET ORAL at 12:14

## 2019-12-09 RX ADMIN — METFORMIN HYDROCHLORIDE 1 TABLET: 500 TABLET, EXTENDED RELEASE ORAL at 08:56

## 2019-12-09 RX ADMIN — Medication 10 ML: at 08:56

## 2019-12-09 RX ADMIN — OXYCODONE HYDROCHLORIDE AND ACETAMINOPHEN 2 TABLET: 5; 325 TABLET ORAL at 16:25

## 2019-12-09 RX ADMIN — Medication: at 16:25

## 2019-12-09 RX ADMIN — DIPHENHYDRAMINE HYDROCHLORIDE 25 MG: 50 INJECTION, SOLUTION INTRAMUSCULAR; INTRAVENOUS at 04:02

## 2019-12-09 RX ADMIN — Medication 10 ML: at 06:27

## 2019-12-09 RX ADMIN — OXYCODONE HYDROCHLORIDE AND ACETAMINOPHEN 2 TABLET: 5; 325 TABLET ORAL at 20:28

## 2019-12-09 RX ADMIN — FERROUS SULFATE TAB 325 MG (65 MG ELEMENTAL FE) 325 MG: 325 (65 FE) TAB at 16:25

## 2019-12-09 RX ADMIN — KETOROLAC TROMETHAMINE 15 MG: 30 INJECTION, SOLUTION INTRAMUSCULAR at 00:50

## 2019-12-09 RX ADMIN — IBUPROFEN 800 MG: 800 TABLET, FILM COATED ORAL at 21:06

## 2019-12-09 RX ADMIN — SODIUM CHLORIDE, POTASSIUM CHLORIDE, SODIUM LACTATE AND CALCIUM CHLORIDE: 600; 310; 30; 20 INJECTION, SOLUTION INTRAVENOUS at 02:18

## 2019-12-09 RX ADMIN — IBUPROFEN 800 MG: 800 TABLET, FILM COATED ORAL at 13:08

## 2019-12-09 RX ADMIN — DOCUSATE SODIUM 100 MG: 100 CAPSULE, LIQUID FILLED ORAL at 21:06

## 2019-12-09 RX ADMIN — DOCUSATE SODIUM 100 MG: 100 CAPSULE, LIQUID FILLED ORAL at 08:56

## 2019-12-09 ASSESSMENT — PAIN SCALES - GENERAL
PAINLEVEL_OUTOF10: 10
PAINLEVEL_OUTOF10: 7
PAINLEVEL_OUTOF10: 7
PAINLEVEL_OUTOF10: 8
PAINLEVEL_OUTOF10: 7

## 2019-12-10 LAB — METER GLUCOSE: 130 MG/DL (ref 74–99)

## 2019-12-10 PROCEDURE — 82962 GLUCOSE BLOOD TEST: CPT

## 2019-12-10 PROCEDURE — 2060000000 HC ICU INTERMEDIATE R&B

## 2019-12-10 PROCEDURE — 2580000003 HC RX 258: Performed by: OBSTETRICS & GYNECOLOGY

## 2019-12-10 PROCEDURE — 6370000000 HC RX 637 (ALT 250 FOR IP): Performed by: OBSTETRICS & GYNECOLOGY

## 2019-12-10 RX ORDER — SIMETHICONE 80 MG
80 TABLET,CHEWABLE ORAL EVERY 6 HOURS PRN
Status: DISCONTINUED | OUTPATIENT
Start: 2019-12-10 | End: 2019-12-12 | Stop reason: HOSPADM

## 2019-12-10 RX ORDER — BISACODYL 10 MG
10 SUPPOSITORY, RECTAL RECTAL DAILY PRN
Status: DISCONTINUED | OUTPATIENT
Start: 2019-12-10 | End: 2019-12-12 | Stop reason: HOSPADM

## 2019-12-10 RX ADMIN — METFORMIN HYDROCHLORIDE 1 TABLET: 500 TABLET, EXTENDED RELEASE ORAL at 08:24

## 2019-12-10 RX ADMIN — MAGNESIUM HYDROXIDE 30 ML: 400 SUSPENSION ORAL at 20:10

## 2019-12-10 RX ADMIN — IBUPROFEN 800 MG: 800 TABLET, FILM COATED ORAL at 11:27

## 2019-12-10 RX ADMIN — OXYCODONE HYDROCHLORIDE AND ACETAMINOPHEN 2 TABLET: 5; 325 TABLET ORAL at 01:34

## 2019-12-10 RX ADMIN — OXYCODONE HYDROCHLORIDE AND ACETAMINOPHEN 2 TABLET: 5; 325 TABLET ORAL at 21:11

## 2019-12-10 RX ADMIN — FERROUS SULFATE TAB 325 MG (65 MG ELEMENTAL FE) 325 MG: 325 (65 FE) TAB at 08:24

## 2019-12-10 RX ADMIN — IBUPROFEN 800 MG: 800 TABLET, FILM COATED ORAL at 20:10

## 2019-12-10 RX ADMIN — DOCUSATE SODIUM 100 MG: 100 CAPSULE, LIQUID FILLED ORAL at 20:11

## 2019-12-10 RX ADMIN — FERROUS SULFATE TAB 325 MG (65 MG ELEMENTAL FE) 325 MG: 325 (65 FE) TAB at 17:38

## 2019-12-10 RX ADMIN — OXYCODONE HYDROCHLORIDE AND ACETAMINOPHEN 2 TABLET: 5; 325 TABLET ORAL at 15:04

## 2019-12-10 RX ADMIN — Medication 10 ML: at 23:00

## 2019-12-10 RX ADMIN — OXYCODONE HYDROCHLORIDE AND ACETAMINOPHEN 2 TABLET: 5; 325 TABLET ORAL at 08:24

## 2019-12-10 RX ADMIN — DOCUSATE SODIUM 100 MG: 100 CAPSULE, LIQUID FILLED ORAL at 11:31

## 2019-12-10 ASSESSMENT — PAIN SCALES - GENERAL
PAINLEVEL_OUTOF10: 10
PAINLEVEL_OUTOF10: 6
PAINLEVEL_OUTOF10: 7
PAINLEVEL_OUTOF10: 0
PAINLEVEL_OUTOF10: 8
PAINLEVEL_OUTOF10: 9
PAINLEVEL_OUTOF10: 7
PAINLEVEL_OUTOF10: 0

## 2019-12-11 LAB
BLOOD BANK DISPENSE STATUS: NORMAL
BLOOD BANK DISPENSE STATUS: NORMAL
BLOOD BANK PRODUCT CODE: NORMAL
BLOOD BANK PRODUCT CODE: NORMAL
BPU ID: NORMAL
BPU ID: NORMAL
DESCRIPTION BLOOD BANK: NORMAL
DESCRIPTION BLOOD BANK: NORMAL

## 2019-12-11 PROCEDURE — 2580000003 HC RX 258: Performed by: OBSTETRICS & GYNECOLOGY

## 2019-12-11 PROCEDURE — 36430 TRANSFUSION BLD/BLD COMPNT: CPT | Performed by: NURSE PRACTITIONER

## 2019-12-11 PROCEDURE — 2060000000 HC ICU INTERMEDIATE R&B

## 2019-12-11 PROCEDURE — 6370000000 HC RX 637 (ALT 250 FOR IP): Performed by: OBSTETRICS & GYNECOLOGY

## 2019-12-11 RX ORDER — 0.9 % SODIUM CHLORIDE 0.9 %
250 INTRAVENOUS SOLUTION INTRAVENOUS ONCE
Status: COMPLETED | OUTPATIENT
Start: 2019-12-11 | End: 2019-12-11

## 2019-12-11 RX ADMIN — OXYCODONE HYDROCHLORIDE AND ACETAMINOPHEN 2 TABLET: 5; 325 TABLET ORAL at 04:16

## 2019-12-11 RX ADMIN — Medication 10 ML: at 10:25

## 2019-12-11 RX ADMIN — METFORMIN HYDROCHLORIDE 1 TABLET: 500 TABLET, EXTENDED RELEASE ORAL at 10:24

## 2019-12-11 RX ADMIN — SODIUM CHLORIDE 100 ML: 9 INJECTION, SOLUTION INTRAVENOUS at 17:31

## 2019-12-11 RX ADMIN — OXYCODONE HYDROCHLORIDE AND ACETAMINOPHEN 2 TABLET: 5; 325 TABLET ORAL at 20:09

## 2019-12-11 RX ADMIN — FERROUS SULFATE TAB 325 MG (65 MG ELEMENTAL FE) 325 MG: 325 (65 FE) TAB at 18:12

## 2019-12-11 RX ADMIN — DOCUSATE SODIUM 100 MG: 100 CAPSULE, LIQUID FILLED ORAL at 10:25

## 2019-12-11 RX ADMIN — IBUPROFEN 800 MG: 800 TABLET, FILM COATED ORAL at 10:25

## 2019-12-11 RX ADMIN — OXYCODONE HYDROCHLORIDE AND ACETAMINOPHEN 2 TABLET: 5; 325 TABLET ORAL at 10:25

## 2019-12-11 RX ADMIN — DOCUSATE SODIUM 100 MG: 100 CAPSULE, LIQUID FILLED ORAL at 20:09

## 2019-12-11 RX ADMIN — IBUPROFEN 800 MG: 800 TABLET, FILM COATED ORAL at 04:17

## 2019-12-11 RX ADMIN — IBUPROFEN 800 MG: 800 TABLET, FILM COATED ORAL at 20:09

## 2019-12-11 RX ADMIN — FERROUS SULFATE TAB 325 MG (65 MG ELEMENTAL FE) 325 MG: 325 (65 FE) TAB at 10:24

## 2019-12-11 ASSESSMENT — PAIN DESCRIPTION - LOCATION: LOCATION: ABDOMEN

## 2019-12-11 ASSESSMENT — PAIN SCALES - GENERAL
PAINLEVEL_OUTOF10: 0
PAINLEVEL_OUTOF10: 6
PAINLEVEL_OUTOF10: 6
PAINLEVEL_OUTOF10: 7
PAINLEVEL_OUTOF10: 0
PAINLEVEL_OUTOF10: 6

## 2019-12-11 ASSESSMENT — PAIN DESCRIPTION - ONSET: ONSET: ON-GOING

## 2019-12-11 ASSESSMENT — PAIN DESCRIPTION - DESCRIPTORS: DESCRIPTORS: ACHING;CONSTANT;DISCOMFORT

## 2019-12-11 ASSESSMENT — PAIN DESCRIPTION - ORIENTATION: ORIENTATION: MID;LOWER

## 2019-12-11 ASSESSMENT — PAIN DESCRIPTION - PAIN TYPE: TYPE: ACUTE PAIN

## 2019-12-11 ASSESSMENT — PAIN DESCRIPTION - PROGRESSION: CLINICAL_PROGRESSION: NOT CHANGED

## 2019-12-11 ASSESSMENT — PAIN - FUNCTIONAL ASSESSMENT: PAIN_FUNCTIONAL_ASSESSMENT: ACTIVITIES ARE NOT PREVENTED

## 2019-12-11 ASSESSMENT — PAIN DESCRIPTION - FREQUENCY: FREQUENCY: CONTINUOUS

## 2019-12-12 VITALS
OXYGEN SATURATION: 98 % | BODY MASS INDEX: 40.22 KG/M2 | HEART RATE: 78 BPM | SYSTOLIC BLOOD PRESSURE: 116 MMHG | TEMPERATURE: 98.5 F | WEIGHT: 227 LBS | HEIGHT: 63 IN | RESPIRATION RATE: 16 BRPM | DIASTOLIC BLOOD PRESSURE: 74 MMHG

## 2019-12-12 LAB
HCT VFR BLD CALC: 26.5 % (ref 34–48)
HEMOGLOBIN: 8.1 G/DL (ref 11.5–15.5)
REASON FOR REJECTION: NORMAL
REJECTED TEST: NORMAL

## 2019-12-12 PROCEDURE — 85014 HEMATOCRIT: CPT

## 2019-12-12 PROCEDURE — 90715 TDAP VACCINE 7 YRS/> IM: CPT | Performed by: OBSTETRICS & GYNECOLOGY

## 2019-12-12 PROCEDURE — 6360000002 HC RX W HCPCS: Performed by: OBSTETRICS & GYNECOLOGY

## 2019-12-12 PROCEDURE — 2580000003 HC RX 258: Performed by: OBSTETRICS & GYNECOLOGY

## 2019-12-12 PROCEDURE — 85018 HEMOGLOBIN: CPT

## 2019-12-12 PROCEDURE — 36415 COLL VENOUS BLD VENIPUNCTURE: CPT

## 2019-12-12 PROCEDURE — 90471 IMMUNIZATION ADMIN: CPT | Performed by: OBSTETRICS & GYNECOLOGY

## 2019-12-12 PROCEDURE — 6370000000 HC RX 637 (ALT 250 FOR IP): Performed by: OBSTETRICS & GYNECOLOGY

## 2019-12-12 RX ORDER — OXYCODONE HYDROCHLORIDE AND ACETAMINOPHEN 5; 325 MG/1; MG/1
1 TABLET ORAL EVERY 6 HOURS PRN
Qty: 28 TABLET | Refills: 0 | Status: ON HOLD | OUTPATIENT
Start: 2019-12-12 | End: 2019-12-23

## 2019-12-12 RX ORDER — LANOLIN 100 %
OINTMENT (GRAM) TOPICAL
Qty: 1 TUBE | Refills: 3 | Status: SHIPPED | OUTPATIENT
Start: 2019-12-12

## 2019-12-12 RX ORDER — PSEUDOEPHEDRINE HCL 30 MG
100 TABLET ORAL 2 TIMES DAILY
Qty: 60 CAPSULE | Refills: 3 | Status: SHIPPED | OUTPATIENT
Start: 2019-12-12

## 2019-12-12 RX ORDER — SIMETHICONE 80 MG
80 TABLET,CHEWABLE ORAL EVERY 6 HOURS PRN
Qty: 60 TABLET | Refills: 3 | Status: SHIPPED | OUTPATIENT
Start: 2019-12-12

## 2019-12-12 RX ORDER — FERROUS SULFATE 325(65) MG
325 TABLET ORAL 2 TIMES DAILY WITH MEALS
Qty: 60 TABLET | Refills: 3 | Status: SHIPPED | OUTPATIENT
Start: 2019-12-12

## 2019-12-12 RX ORDER — IBUPROFEN 800 MG/1
800 TABLET ORAL EVERY 8 HOURS
Qty: 60 TABLET | Refills: 3 | Status: SHIPPED | OUTPATIENT
Start: 2019-12-12

## 2019-12-12 RX ADMIN — IBUPROFEN 800 MG: 800 TABLET, FILM COATED ORAL at 03:33

## 2019-12-12 RX ADMIN — OXYCODONE HYDROCHLORIDE AND ACETAMINOPHEN 2 TABLET: 5; 325 TABLET ORAL at 03:33

## 2019-12-12 RX ADMIN — IBUPROFEN 800 MG: 800 TABLET, FILM COATED ORAL at 13:21

## 2019-12-12 RX ADMIN — FERROUS SULFATE TAB 325 MG (65 MG ELEMENTAL FE) 325 MG: 325 (65 FE) TAB at 08:48

## 2019-12-12 RX ADMIN — DOCUSATE SODIUM 100 MG: 100 CAPSULE, LIQUID FILLED ORAL at 08:48

## 2019-12-12 RX ADMIN — Medication 10 ML: at 08:48

## 2019-12-12 RX ADMIN — METFORMIN HYDROCHLORIDE 1 TABLET: 500 TABLET, EXTENDED RELEASE ORAL at 08:48

## 2019-12-12 RX ADMIN — TETANUS TOXOID, REDUCED DIPHTHERIA TOXOID AND ACELLULAR PERTUSSIS VACCINE, ADSORBED 0.5 ML: 5; 2.5; 8; 8; 2.5 SUSPENSION INTRAMUSCULAR at 15:50

## 2019-12-12 RX ADMIN — OXYCODONE HYDROCHLORIDE AND ACETAMINOPHEN 1 TABLET: 5; 325 TABLET ORAL at 08:48

## 2019-12-12 ASSESSMENT — PAIN DESCRIPTION - DESCRIPTORS
DESCRIPTORS: CRAMPING;DISCOMFORT;PRESSURE
DESCRIPTORS: CRAMPING;DISCOMFORT;SORE

## 2019-12-12 ASSESSMENT — PAIN SCALES - GENERAL
PAINLEVEL_OUTOF10: 5
PAINLEVEL_OUTOF10: 0
PAINLEVEL_OUTOF10: 6
PAINLEVEL_OUTOF10: 5
PAINLEVEL_OUTOF10: 2
PAINLEVEL_OUTOF10: 0

## 2019-12-12 ASSESSMENT — PAIN DESCRIPTION - ONSET
ONSET: ON-GOING
ONSET: ON-GOING

## 2019-12-12 ASSESSMENT — PAIN DESCRIPTION - FREQUENCY
FREQUENCY: INTERMITTENT
FREQUENCY: CONTINUOUS

## 2019-12-12 ASSESSMENT — PAIN DESCRIPTION - LOCATION
LOCATION: ABDOMEN
LOCATION: ABDOMEN;BACK

## 2019-12-12 ASSESSMENT — PAIN DESCRIPTION - ORIENTATION
ORIENTATION: MID;LOWER
ORIENTATION: LOWER

## 2019-12-12 ASSESSMENT — PAIN - FUNCTIONAL ASSESSMENT
PAIN_FUNCTIONAL_ASSESSMENT: ACTIVITIES ARE NOT PREVENTED
PAIN_FUNCTIONAL_ASSESSMENT: ACTIVITIES ARE NOT PREVENTED

## 2019-12-12 ASSESSMENT — PAIN DESCRIPTION - PAIN TYPE
TYPE: ACUTE PAIN
TYPE: ACUTE PAIN

## 2019-12-12 ASSESSMENT — PAIN DESCRIPTION - PROGRESSION
CLINICAL_PROGRESSION: GRADUALLY WORSENING
CLINICAL_PROGRESSION: GRADUALLY WORSENING

## 2019-12-19 ENCOUNTER — HOSPITAL ENCOUNTER (INPATIENT)
Age: 34
LOS: 3 days | Discharge: HOME | DRG: 548 | End: 2019-12-23
Attending: EMERGENCY MEDICINE | Admitting: OBSTETRICS & GYNECOLOGY
Payer: COMMERCIAL

## 2019-12-19 ENCOUNTER — APPOINTMENT (OUTPATIENT)
Dept: CT IMAGING | Age: 34
DRG: 548 | End: 2019-12-19
Payer: COMMERCIAL

## 2019-12-19 PROBLEM — L03.90 CELLULITIS: Status: ACTIVE | Noted: 2019-12-19

## 2019-12-19 LAB
ALBUMIN SERPL-MCNC: 3.5 G/DL (ref 3.5–5.2)
ALP BLD-CCNC: 111 U/L (ref 35–104)
ALT SERPL-CCNC: 13 U/L (ref 0–32)
AMORPHOUS: ABNORMAL
ANION GAP SERPL CALCULATED.3IONS-SCNC: 12 MMOL/L (ref 7–16)
AST SERPL-CCNC: 25 U/L (ref 0–31)
BACTERIA: ABNORMAL /HPF
BASOPHILS ABSOLUTE: 0.03 E9/L (ref 0–0.2)
BASOPHILS RELATIVE PERCENT: 0.2 % (ref 0–2)
BILIRUB SERPL-MCNC: 0.3 MG/DL (ref 0–1.2)
BILIRUBIN URINE: NEGATIVE
BLOOD, URINE: NORMAL
BUN BLDV-MCNC: 13 MG/DL (ref 6–20)
CALCIUM SERPL-MCNC: 8.8 MG/DL (ref 8.6–10.2)
CHLORIDE BLD-SCNC: 100 MMOL/L (ref 98–107)
CLARITY: CLEAR
CO2: 24 MMOL/L (ref 22–29)
COLOR: YELLOW
CREAT SERPL-MCNC: 0.5 MG/DL (ref 0.5–1)
EOSINOPHILS ABSOLUTE: 0.09 E9/L (ref 0.05–0.5)
EOSINOPHILS RELATIVE PERCENT: 0.6 % (ref 0–6)
EPITHELIAL CELLS, UA: ABNORMAL /HPF
GFR AFRICAN AMERICAN: >60
GFR NON-AFRICAN AMERICAN: >60 ML/MIN/1.73
GLUCOSE BLD-MCNC: 99 MG/DL (ref 74–99)
GLUCOSE URINE: NEGATIVE MG/DL
HCT VFR BLD CALC: 34.1 % (ref 34–48)
HEMOGLOBIN: 10.1 G/DL (ref 11.5–15.5)
IMMATURE GRANULOCYTES #: 0.09 E9/L
IMMATURE GRANULOCYTES %: 0.6 % (ref 0–5)
KETONES, URINE: NEGATIVE MG/DL
LACTIC ACID: 2 MMOL/L (ref 0.5–2.2)
LEUKOCYTE ESTERASE, URINE: NEGATIVE
LYMPHOCYTES ABSOLUTE: 1.46 E9/L (ref 1.5–4)
LYMPHOCYTES RELATIVE PERCENT: 9.4 % (ref 20–42)
MCH RBC QN AUTO: 25.4 PG (ref 26–35)
MCHC RBC AUTO-ENTMCNC: 29.6 % (ref 32–34.5)
MCV RBC AUTO: 85.9 FL (ref 80–99.9)
MONOCYTES ABSOLUTE: 0.9 E9/L (ref 0.1–0.95)
MONOCYTES RELATIVE PERCENT: 5.8 % (ref 2–12)
MUCUS: PRESENT
NEUTROPHILS ABSOLUTE: 13.04 E9/L (ref 1.8–7.3)
NEUTROPHILS RELATIVE PERCENT: 83.4 % (ref 43–80)
NITRITE, URINE: NEGATIVE
PDW BLD-RTO: 18.6 FL (ref 11.5–15)
PH UA: 7 (ref 5–9)
PLATELET # BLD: 322 E9/L (ref 130–450)
PMV BLD AUTO: 10.3 FL (ref 7–12)
POTASSIUM REFLEX MAGNESIUM: 4.3 MMOL/L (ref 3.5–5)
PROTEIN UA: NEGATIVE MG/DL
RBC # BLD: 3.97 E12/L (ref 3.5–5.5)
RBC UA: ABNORMAL /HPF (ref 0–2)
SODIUM BLD-SCNC: 136 MMOL/L (ref 132–146)
SPECIFIC GRAVITY UA: 1.02 (ref 1–1.03)
TOTAL PROTEIN: 7.5 G/DL (ref 6.4–8.3)
UROBILINOGEN, URINE: 1 E.U./DL
WBC # BLD: 15.6 E9/L (ref 4.5–11.5)
WBC UA: ABNORMAL /HPF (ref 0–5)

## 2019-12-19 PROCEDURE — 99284 EMERGENCY DEPT VISIT MOD MDM: CPT

## 2019-12-19 PROCEDURE — 87088 URINE BACTERIA CULTURE: CPT

## 2019-12-19 PROCEDURE — 96375 TX/PRO/DX INJ NEW DRUG ADDON: CPT

## 2019-12-19 PROCEDURE — 2580000003 HC RX 258: Performed by: OBSTETRICS & GYNECOLOGY

## 2019-12-19 PROCEDURE — 85025 COMPLETE CBC W/AUTO DIFF WBC: CPT

## 2019-12-19 PROCEDURE — G0378 HOSPITAL OBSERVATION PER HR: HCPCS

## 2019-12-19 PROCEDURE — 6360000002 HC RX W HCPCS

## 2019-12-19 PROCEDURE — 6370000000 HC RX 637 (ALT 250 FOR IP)

## 2019-12-19 PROCEDURE — 83605 ASSAY OF LACTIC ACID: CPT

## 2019-12-19 PROCEDURE — 81001 URINALYSIS AUTO W/SCOPE: CPT

## 2019-12-19 PROCEDURE — 96366 THER/PROPH/DIAG IV INF ADDON: CPT

## 2019-12-19 PROCEDURE — 80053 COMPREHEN METABOLIC PANEL: CPT

## 2019-12-19 PROCEDURE — 87040 BLOOD CULTURE FOR BACTERIA: CPT

## 2019-12-19 PROCEDURE — 74177 CT ABD & PELVIS W/CONTRAST: CPT

## 2019-12-19 PROCEDURE — 2580000003 HC RX 258: Performed by: EMERGENCY MEDICINE

## 2019-12-19 PROCEDURE — 6360000002 HC RX W HCPCS: Performed by: EMERGENCY MEDICINE

## 2019-12-19 PROCEDURE — 96365 THER/PROPH/DIAG IV INF INIT: CPT

## 2019-12-19 PROCEDURE — 6360000004 HC RX CONTRAST MEDICATION: Performed by: RADIOLOGY

## 2019-12-19 RX ORDER — DIPHENHYDRAMINE HYDROCHLORIDE 50 MG/ML
INJECTION INTRAMUSCULAR; INTRAVENOUS
Status: COMPLETED
Start: 2019-12-19 | End: 2019-12-19

## 2019-12-19 RX ORDER — 0.9 % SODIUM CHLORIDE 0.9 %
1000 INTRAVENOUS SOLUTION INTRAVENOUS ONCE
Status: COMPLETED | OUTPATIENT
Start: 2019-12-19 | End: 2019-12-19

## 2019-12-19 RX ORDER — FENTANYL CITRATE 50 UG/ML
75 INJECTION, SOLUTION INTRAMUSCULAR; INTRAVENOUS ONCE
Status: COMPLETED | OUTPATIENT
Start: 2019-12-19 | End: 2019-12-19

## 2019-12-19 RX ORDER — IBUPROFEN 800 MG/1
800 TABLET ORAL EVERY 8 HOURS PRN
Status: DISCONTINUED | OUTPATIENT
Start: 2019-12-19 | End: 2019-12-20

## 2019-12-19 RX ORDER — IBUPROFEN 800 MG/1
TABLET ORAL
Status: COMPLETED
Start: 2019-12-19 | End: 2019-12-19

## 2019-12-19 RX ORDER — SODIUM CHLORIDE, SODIUM LACTATE, POTASSIUM CHLORIDE, CALCIUM CHLORIDE 600; 310; 30; 20 MG/100ML; MG/100ML; MG/100ML; MG/100ML
INJECTION, SOLUTION INTRAVENOUS CONTINUOUS
Status: DISCONTINUED | OUTPATIENT
Start: 2019-12-19 | End: 2019-12-23 | Stop reason: HOSPADM

## 2019-12-19 RX ADMIN — FENTANYL CITRATE 75 MCG: 50 INJECTION, SOLUTION INTRAMUSCULAR; INTRAVENOUS at 13:20

## 2019-12-19 RX ADMIN — WATER 2 G: 1 INJECTION INTRAMUSCULAR; INTRAVENOUS; SUBCUTANEOUS at 15:39

## 2019-12-19 RX ADMIN — IBUPROFEN 800 MG: 800 TABLET, FILM COATED ORAL at 20:14

## 2019-12-19 RX ADMIN — IOPAMIDOL 110 ML: 755 INJECTION, SOLUTION INTRAVENOUS at 14:59

## 2019-12-19 RX ADMIN — VANCOMYCIN HYDROCHLORIDE 2000 MG: 10 INJECTION, POWDER, LYOPHILIZED, FOR SOLUTION INTRAVENOUS at 16:48

## 2019-12-19 RX ADMIN — SODIUM CHLORIDE 1000 ML: 9 INJECTION, SOLUTION INTRAVENOUS at 13:21

## 2019-12-19 RX ADMIN — DIPHENHYDRAMINE HYDROCHLORIDE 50 MG: 50 INJECTION, SOLUTION INTRAMUSCULAR; INTRAVENOUS at 18:31

## 2019-12-19 RX ADMIN — SODIUM CHLORIDE, POTASSIUM CHLORIDE, SODIUM LACTATE AND CALCIUM CHLORIDE: 600; 310; 30; 20 INJECTION, SOLUTION INTRAVENOUS at 20:16

## 2019-12-19 ASSESSMENT — ENCOUNTER SYMPTOMS
EYE REDNESS: 0
ABDOMINAL PAIN: 1
BACK PAIN: 0
SORE THROAT: 0
ABDOMINAL DISTENTION: 0
NAUSEA: 0
SHORTNESS OF BREATH: 0
COUGH: 0
EYE PAIN: 0
WHEEZING: 0
DIARRHEA: 0
SINUS PRESSURE: 0
VOMITING: 0
EYE DISCHARGE: 0

## 2019-12-19 ASSESSMENT — PAIN DESCRIPTION - PAIN TYPE
TYPE: ACUTE PAIN
TYPE: ACUTE PAIN;SURGICAL PAIN

## 2019-12-19 ASSESSMENT — PAIN DESCRIPTION - LOCATION
LOCATION: ABDOMEN;BACK
LOCATION: ABDOMEN;BACK

## 2019-12-19 ASSESSMENT — PAIN DESCRIPTION - FREQUENCY
FREQUENCY: CONTINUOUS
FREQUENCY: CONTINUOUS

## 2019-12-19 ASSESSMENT — PAIN SCALES - GENERAL
PAINLEVEL_OUTOF10: 10
PAINLEVEL_OUTOF10: 8
PAINLEVEL_OUTOF10: 10
PAINLEVEL_OUTOF10: 6
PAINLEVEL_OUTOF10: 10
PAINLEVEL_OUTOF10: 8

## 2019-12-19 ASSESSMENT — PAIN DESCRIPTION - ONSET
ONSET: ON-GOING
ONSET: ON-GOING

## 2019-12-19 ASSESSMENT — PAIN DESCRIPTION - ORIENTATION
ORIENTATION: RIGHT
ORIENTATION: RIGHT

## 2019-12-19 ASSESSMENT — PAIN DESCRIPTION - PROGRESSION
CLINICAL_PROGRESSION: NOT CHANGED
CLINICAL_PROGRESSION: NOT CHANGED

## 2019-12-19 ASSESSMENT — PAIN DESCRIPTION - DESCRIPTORS: DESCRIPTORS: ACHING;DISCOMFORT;PRESSURE

## 2019-12-19 NOTE — ED PROVIDER NOTES
Tachycardia present. Pulmonary:      Effort: Pulmonary effort is normal. No respiratory distress. Breath sounds: Normal breath sounds. No wheezing or rales. Abdominal:      General: Bowel sounds are normal.      Palpations: Abdomen is soft. Tenderness: There is tenderness. There is guarding. There is no rebound. Comments: Patient has an incision from an apparent  per the patient was 3 weeks prior. She also the area of erythema with swelling on the right abdomen. Skin:     General: Skin is warm and dry. Neurological:      Mental Status: She is alert and oriented to person, place, and time. Cranial Nerves: No cranial nerve deficit. Coordination: Coordination normal.          Procedures     MDM  Number of Diagnoses or Management Options  Diagnosis management comments: Patient presented with cellulitis of the right abdomen 3 weeks post . Patient had been at her OB office earlier today and was sent to the ED by him. Her OB is Dr. Fay Keen. CT imaging revealed that she had some cellulitis without an underlying abscess on her right abdomen. Due to this she was given Ancef and bank empiric antibiotic therapy and admitted to Dr. Fay Keen with infectious disease consult for further care.          --------------------------------------------- PAST HISTORY ---------------------------------------------  Past Medical History:  has a past medical history of Gestational diabetes mellitus (GDM) affecting pregnancy. Past Surgical History:  has a past surgical history that includes [place] tooth extraction and  section (N/A, 2019). Social History:  reports that she quit smoking about a year ago. Her smoking use included cigarettes. She started smoking about 17 years ago. She smoked 0.50 packs per day. She has never used smokeless tobacco. She reports previous drug use. Frequency: 21.00 times per week. Drug: Marijuana.  She reports that she does not drink

## 2019-12-20 ENCOUNTER — ANESTHESIA EVENT (OUTPATIENT)
Dept: OPERATING ROOM | Age: 34
DRG: 548 | End: 2019-12-20
Payer: COMMERCIAL

## 2019-12-20 ENCOUNTER — APPOINTMENT (OUTPATIENT)
Dept: CT IMAGING | Age: 34
DRG: 548 | End: 2019-12-20
Payer: COMMERCIAL

## 2019-12-20 LAB
HCT VFR BLD CALC: 30.9 % (ref 34–48)
HEMOGLOBIN: 9.4 G/DL (ref 11.5–15.5)
MCH RBC QN AUTO: 25.5 PG (ref 26–35)
MCHC RBC AUTO-ENTMCNC: 30.4 % (ref 32–34.5)
MCV RBC AUTO: 84 FL (ref 80–99.9)
PDW BLD-RTO: 18.3 FL (ref 11.5–15)
PLATELET # BLD: 299 E9/L (ref 130–450)
PMV BLD AUTO: 10.2 FL (ref 7–12)
RBC # BLD: 3.68 E12/L (ref 3.5–5.5)
WBC # BLD: 13.9 E9/L (ref 4.5–11.5)

## 2019-12-20 PROCEDURE — 2500000003 HC RX 250 WO HCPCS: Performed by: SURGERY

## 2019-12-20 PROCEDURE — 2580000003 HC RX 258: Performed by: OBSTETRICS & GYNECOLOGY

## 2019-12-20 PROCEDURE — 74177 CT ABD & PELVIS W/CONTRAST: CPT

## 2019-12-20 PROCEDURE — 85027 COMPLETE CBC AUTOMATED: CPT

## 2019-12-20 PROCEDURE — 87070 CULTURE OTHR SPECIMN AEROBIC: CPT

## 2019-12-20 PROCEDURE — 2580000003 HC RX 258: Performed by: INTERNAL MEDICINE

## 2019-12-20 PROCEDURE — 87205 SMEAR GRAM STAIN: CPT

## 2019-12-20 PROCEDURE — 36415 COLL VENOUS BLD VENIPUNCTURE: CPT

## 2019-12-20 PROCEDURE — 6360000002 HC RX W HCPCS: Performed by: INTERNAL MEDICINE

## 2019-12-20 PROCEDURE — 87186 SC STD MICRODIL/AGAR DIL: CPT

## 2019-12-20 PROCEDURE — 6370000000 HC RX 637 (ALT 250 FOR IP): Performed by: OBSTETRICS & GYNECOLOGY

## 2019-12-20 PROCEDURE — 0W9F0ZZ DRAINAGE OF ABDOMINAL WALL, OPEN APPROACH: ICD-10-PCS | Performed by: SURGERY

## 2019-12-20 PROCEDURE — 6360000004 HC RX CONTRAST MEDICATION: Performed by: RADIOLOGY

## 2019-12-20 PROCEDURE — 1200000000 HC SEMI PRIVATE

## 2019-12-20 PROCEDURE — 87077 CULTURE AEROBIC IDENTIFY: CPT

## 2019-12-20 PROCEDURE — 6360000002 HC RX W HCPCS: Performed by: SURGERY

## 2019-12-20 PROCEDURE — 87081 CULTURE SCREEN ONLY: CPT

## 2019-12-20 PROCEDURE — 87075 CULTR BACTERIA EXCEPT BLOOD: CPT

## 2019-12-20 PROCEDURE — 6370000000 HC RX 637 (ALT 250 FOR IP): Performed by: INTERNAL MEDICINE

## 2019-12-20 RX ORDER — KETOROLAC TROMETHAMINE 30 MG/ML
15 INJECTION, SOLUTION INTRAMUSCULAR; INTRAVENOUS EVERY 6 HOURS PRN
Status: DISCONTINUED | OUTPATIENT
Start: 2019-12-20 | End: 2019-12-20

## 2019-12-20 RX ORDER — MORPHINE SULFATE 2 MG/ML
2 INJECTION, SOLUTION INTRAMUSCULAR; INTRAVENOUS
Status: COMPLETED | OUTPATIENT
Start: 2019-12-20 | End: 2019-12-22

## 2019-12-20 RX ORDER — MORPHINE SULFATE 2 MG/ML
2 INJECTION, SOLUTION INTRAMUSCULAR; INTRAVENOUS ONCE
Status: COMPLETED | OUTPATIENT
Start: 2019-12-20 | End: 2019-12-20

## 2019-12-20 RX ORDER — KETOROLAC TROMETHAMINE 30 MG/ML
15 INJECTION, SOLUTION INTRAMUSCULAR; INTRAVENOUS EVERY 6 HOURS
Status: DISCONTINUED | OUTPATIENT
Start: 2019-12-20 | End: 2019-12-23 | Stop reason: HOSPADM

## 2019-12-20 RX ORDER — HYDROCODONE BITARTRATE AND ACETAMINOPHEN 5; 325 MG/1; MG/1
1 TABLET ORAL EVERY 4 HOURS PRN
Status: DISCONTINUED | OUTPATIENT
Start: 2019-12-20 | End: 2019-12-20

## 2019-12-20 RX ORDER — LIDOCAINE HYDROCHLORIDE AND EPINEPHRINE 10; 10 MG/ML; UG/ML
40 INJECTION, SOLUTION INFILTRATION; PERINEURAL ONCE
Status: COMPLETED | OUTPATIENT
Start: 2019-12-20 | End: 2019-12-20

## 2019-12-20 RX ORDER — HYDROCODONE BITARTRATE AND ACETAMINOPHEN 5; 325 MG/1; MG/1
2 TABLET ORAL EVERY 4 HOURS PRN
Status: DISCONTINUED | OUTPATIENT
Start: 2019-12-20 | End: 2019-12-20

## 2019-12-20 RX ORDER — LINEZOLID 600 MG/1
600 TABLET, FILM COATED ORAL EVERY 12 HOURS SCHEDULED
Status: DISCONTINUED | OUTPATIENT
Start: 2019-12-20 | End: 2019-12-22

## 2019-12-20 RX ORDER — HYDROCODONE BITARTRATE AND ACETAMINOPHEN 5; 325 MG/1; MG/1
2 TABLET ORAL EVERY 4 HOURS PRN
Status: DISCONTINUED | OUTPATIENT
Start: 2019-12-20 | End: 2019-12-23 | Stop reason: HOSPADM

## 2019-12-20 RX ORDER — HYDROCODONE BITARTRATE AND ACETAMINOPHEN 5; 325 MG/1; MG/1
1 TABLET ORAL EVERY 4 HOURS PRN
Status: DISCONTINUED | OUTPATIENT
Start: 2019-12-20 | End: 2019-12-23 | Stop reason: HOSPADM

## 2019-12-20 RX ADMIN — IOPAMIDOL 110 ML: 755 INJECTION, SOLUTION INTRAVENOUS at 11:59

## 2019-12-20 RX ADMIN — HYDROCODONE BITARTRATE AND ACETAMINOPHEN 1 TABLET: 5; 325 TABLET ORAL at 19:36

## 2019-12-20 RX ADMIN — LINEZOLID 600 MG: 600 TABLET, FILM COATED ORAL at 08:02

## 2019-12-20 RX ADMIN — CEFEPIME HYDROCHLORIDE 2 G: 2 INJECTION, POWDER, FOR SOLUTION INTRAVENOUS at 23:48

## 2019-12-20 RX ADMIN — LINEZOLID 600 MG: 600 TABLET, FILM COATED ORAL at 22:15

## 2019-12-20 RX ADMIN — MORPHINE SULFATE 2 MG: 2 INJECTION, SOLUTION INTRAMUSCULAR; INTRAVENOUS at 15:48

## 2019-12-20 RX ADMIN — CEFEPIME HYDROCHLORIDE 2 G: 2 INJECTION, POWDER, FOR SOLUTION INTRAVENOUS at 12:38

## 2019-12-20 RX ADMIN — SODIUM CHLORIDE, POTASSIUM CHLORIDE, SODIUM LACTATE AND CALCIUM CHLORIDE: 600; 310; 30; 20 INJECTION, SOLUTION INTRAVENOUS at 06:21

## 2019-12-20 RX ADMIN — SODIUM CHLORIDE, POTASSIUM CHLORIDE, SODIUM LACTATE AND CALCIUM CHLORIDE: 600; 310; 30; 20 INJECTION, SOLUTION INTRAVENOUS at 18:03

## 2019-12-20 RX ADMIN — IBUPROFEN 800 MG: 800 TABLET, FILM COATED ORAL at 06:28

## 2019-12-20 RX ADMIN — LIDOCAINE HYDROCHLORIDE,EPINEPHRINE BITARTRATE 40 ML: 10; .01 INJECTION, SOLUTION INFILTRATION; PERINEURAL at 16:18

## 2019-12-20 RX ADMIN — KETOROLAC TROMETHAMINE 15 MG: 30 INJECTION, SOLUTION INTRAMUSCULAR at 22:20

## 2019-12-20 RX ADMIN — KETOROLAC TROMETHAMINE 15 MG: 30 INJECTION, SOLUTION INTRAMUSCULAR at 17:58

## 2019-12-20 RX ADMIN — HYDROCODONE BITARTRATE AND ACETAMINOPHEN 2 TABLET: 5; 325 TABLET ORAL at 04:26

## 2019-12-20 RX ADMIN — HYDROCODONE BITARTRATE AND ACETAMINOPHEN 2 TABLET: 5; 325 TABLET ORAL at 12:47

## 2019-12-20 RX ADMIN — MORPHINE SULFATE 2 MG: 2 INJECTION, SOLUTION INTRAMUSCULAR; INTRAVENOUS at 16:09

## 2019-12-20 ASSESSMENT — PAIN DESCRIPTION - DESCRIPTORS
DESCRIPTORS: ACHING;DISCOMFORT
DESCRIPTORS: ACHING;CONSTANT;DISCOMFORT
DESCRIPTORS: ACHING;DISCOMFORT;NAGGING
DESCRIPTORS: ACHING;CONSTANT;DISCOMFORT
DESCRIPTORS: ACHING;DISCOMFORT;SORE
DESCRIPTORS: ACHING;CONSTANT;DISCOMFORT;TIGHTNESS
DESCRIPTORS: ACHING;CONSTANT;DISCOMFORT;SORE

## 2019-12-20 ASSESSMENT — PAIN DESCRIPTION - LOCATION
LOCATION: ABDOMEN
LOCATION: ABDOMEN;BACK
LOCATION: ABDOMEN;BACK
LOCATION: ABDOMEN

## 2019-12-20 ASSESSMENT — PAIN SCALES - GENERAL
PAINLEVEL_OUTOF10: 9
PAINLEVEL_OUTOF10: 8
PAINLEVEL_OUTOF10: 0
PAINLEVEL_OUTOF10: 10
PAINLEVEL_OUTOF10: 7
PAINLEVEL_OUTOF10: 10
PAINLEVEL_OUTOF10: 7
PAINLEVEL_OUTOF10: 7
PAINLEVEL_OUTOF10: 9
PAINLEVEL_OUTOF10: 7
PAINLEVEL_OUTOF10: 4
PAINLEVEL_OUTOF10: 8
PAINLEVEL_OUTOF10: 7
PAINLEVEL_OUTOF10: 10
PAINLEVEL_OUTOF10: 6
PAINLEVEL_OUTOF10: 8

## 2019-12-20 ASSESSMENT — PAIN DESCRIPTION - PROGRESSION
CLINICAL_PROGRESSION: NOT CHANGED

## 2019-12-20 ASSESSMENT — PAIN DESCRIPTION - FREQUENCY
FREQUENCY: CONTINUOUS

## 2019-12-20 ASSESSMENT — PAIN DESCRIPTION - PAIN TYPE
TYPE: ACUTE PAIN;SURGICAL PAIN
TYPE: SURGICAL PAIN
TYPE: ACUTE PAIN;SURGICAL PAIN
TYPE: ACUTE PAIN;SURGICAL PAIN
TYPE: SURGICAL PAIN

## 2019-12-20 ASSESSMENT — PAIN DESCRIPTION - ONSET
ONSET: ON-GOING

## 2019-12-20 ASSESSMENT — PAIN DESCRIPTION - ORIENTATION
ORIENTATION: RIGHT;LOWER

## 2019-12-20 ASSESSMENT — PAIN SCALES - WONG BAKER
WONGBAKER_NUMERICALRESPONSE: 0
WONGBAKER_NUMERICALRESPONSE: 0

## 2019-12-20 ASSESSMENT — PAIN - FUNCTIONAL ASSESSMENT
PAIN_FUNCTIONAL_ASSESSMENT: ACTIVITIES ARE NOT PREVENTED

## 2019-12-20 NOTE — CONSULTS
Discussed ramirez clinical findings with the resident and I agree with the resident's assessment and plan detailed in the note.

## 2019-12-20 NOTE — PLAN OF CARE
Problem: Pain:  Goal: Control of acute pain  Description  Control of acute pain  Outcome: Met This Shift     Problem: Pain:  Goal: Pain level will decrease  Description  Pain level will decrease     Pain level will decrease  Outcome: Met This Shift

## 2019-12-20 NOTE — PROGRESS NOTES
Perfect serve message sent to surgery--Awilda Jacobsen about supplies at bedside and consent signed for bedside I&D.

## 2019-12-20 NOTE — PROGRESS NOTES
Spoke with Dr. Luis Daniel Valles for surgery consult, perfect served surgical resident for consult.

## 2019-12-20 NOTE — PROGRESS NOTES
Called Dr. Alexi Workman office re: antibiotic clarification. Await return phone call.  Electronically signed by Alena Jean Baptiste RN on 12/20/2019 at 8:37 AM

## 2019-12-20 NOTE — PROGRESS NOTES
Patient arrived to unit, VS taken. Patient has temp of 103.1 and . Nurse called Dr. Kaiser Ventura for orders. Orders received. Dr. Kaiser Ventura notified of sepsis alert. Will continue to monitor.

## 2019-12-20 NOTE — CARE COORDINATION
Social Work / Discharge Planning :SW met with patient and explained role as discharge planner/ transition of care. Patient gave birth 12 days ago to a baby boy. Patient states plan is HOME with her family. Patient tearful support provided. Patient currently on Zyvox and IV antibiotic. Await ID plan. SW discussed potential needs at home. Patient receptive to [facility] IF warranted. Patient did not state preference and in agreement for [facility]. Tentative referral made to [facility] from [facility]. IF patient goes on Zyvox will work with CM to see if prior authorization is needed. Will need Script to check benefits. Await ID plan. SW to follow.  Electronically signed by TICO Monae on 12/20/19 at 12:56 PM

## 2019-12-20 NOTE — H&P
History and physical      Chief complaint: A hardening on the right side of the abdomen    History of present illness: Patient is a 58-year-old female status post a  section 12 days ago who presented to my office on 2019 with complaint of a heartening on the right side of her abdomen. She denied any fevers or chills. She denies any nausea vomiting. Past medical history: Significant for  section approximately 12 days ago that was uncomplicated. Also significant for 3 prior vaginal deliveries 2 prior miscarriages. Allergies: Penicillin  Family history: Noncontributory  Review of systems: Noncontributory. Social history: Noncontributory  Physical exam: Temperature 98.3 pulse 87 respirations 18 blood pressure 103/58  General appearance: Alert and oriented with mild distress  H EENT exam: Normocephalic atraumatic pupils reactive, to light random ocular muscle intact neck supple. Lymphadenopathy present  Cardiovascular: Regular rate and rhythm was noted  Lungs: Clear without wheeze or crackles  Abdomen: Incision intact nontender however there is a 6 to 7 x 7 cm mass of the right lateral portion of the abdomen approximately 3 to 4 cm above the incision with erythema and tender to touch. Normal active bowel sounds are present. Extremities: No calf tenderness +2 pedal edema  Assessment: Probable abdominal wall abscess.   Plan: Patient to be admitted started on IV antibiotics and infectious disease consult is to be obtained also a general surgical consult is to be obtained

## 2019-12-20 NOTE — CONSULTS
GENERAL SURGERY  CONSULT NOTE            Date: 2019        Patient Name: Alyssa Chung     YOB: 1985      Age:  27 y.o. Consult by: Dr Marcos Biggs to: Dr Lazaro Orr  Reason:  Possible abdominal wall abscess    Chief Complaint     Chief Complaint   Patient presents with    Post-op Problem     Had  Dec 8th and having pain at incision, saw her OB today         History Obtained From   patient    History of Present Illness   Brooklyn Wisdom is a 27 y.o. female with  19 who presents for persistent severe abdominal pain at incision site from . Denied fevers chills or sweats at home but had 103 fever overnight here. Resolved with antibiotics but her symptoms are not better at all. She has been having pain ever since her surgery and it continually increased since then. Stabbing pain at incision site, progressively increased swelling, she cannot even stand up straight for 20 minutes because of the abdominal wall pain. Denies draining from incision site. Had gestational diabetes which resolved upon delivery and glucose checks have been 90's low 100's lately. Never had diabetic infections before. Fever 103 and mild tachycardia which have resolved now. WBC 16 down to 14 with ancef/vanc    Past Medical History     Past Medical History:   Diagnosis Date    Gestational diabetes mellitus (GDM) affecting pregnancy 10/29/2019        Past Surgical History     Past Surgical History:   Procedure Laterality Date     SECTION N/A 2019     SECTION performed by [facility]ha [facility]a [facility] at [facility] L&D OR    WISDOM TOOTH EXTRACTION          Medications - Prior to Admission and Current Meds     Prior to Admission medications    Medication Sig Start Date End Date Taking?  Authorizing Provider   ibuprofen (ADVIL;MOTRIN) 800 MG tablet Take 1 tablet by mouth every 8 hours 19  Yes Iesha Mesa MD   ferrous sulfate 325 (65 Fe) MG tablet Take 1 tablet by mouth 2 times of acute pancreatitis. Spleen is nonenlarged. Normal attenuation both kidneys. No hydronephrosis. There is a postpartum uterus. View of lung bases shows no evidence of pneumonia. 1. Postoperative changes related to recent  section surgery with areas of inflammation involving the skin and ventral abdominal fat. 2. Thickened appearance of the right abdominal musculature, yet no obvious loculated fluid collections to suggest abscess. If clinical concern persists for abscess, short-term follow-up CT may be helpful for further evaluation. 3. Remainder of examination is unremarkable. Assessment      Hospital Problems           Last Modified POA    Cellulitis 2019 Yes          27 y.o. female with postop incisional infection possible abscess    Plan   - possible bedside I&D via original incision  - continue antibiotics per ID    Plan will be discussed with Dr. Carlos Santos.     Electronically signed by Dahiana Grimaldo MD on 19 at 6:27 AM

## 2019-12-21 ENCOUNTER — ANESTHESIA (OUTPATIENT)
Dept: OPERATING ROOM | Age: 34
DRG: 548 | End: 2019-12-21
Payer: COMMERCIAL

## 2019-12-21 VITALS — OXYGEN SATURATION: 95 % | DIASTOLIC BLOOD PRESSURE: 59 MMHG | SYSTOLIC BLOOD PRESSURE: 124 MMHG

## 2019-12-21 LAB
ABO/RH: NORMAL
ANION GAP SERPL CALCULATED.3IONS-SCNC: 12 MMOL/L (ref 7–16)
ANTIBODY IDENTIFICATION: NORMAL
ANTIBODY IDENTIFICATION: NORMAL
ANTIBODY SCREEN: NORMAL
BASOPHILS ABSOLUTE: 0.02 E9/L (ref 0–0.2)
BASOPHILS RELATIVE PERCENT: 0.2 % (ref 0–2)
BUN BLDV-MCNC: 14 MG/DL (ref 6–20)
CALCIUM SERPL-MCNC: 8.3 MG/DL (ref 8.6–10.2)
CHLORIDE BLD-SCNC: 102 MMOL/L (ref 98–107)
CO2: 23 MMOL/L (ref 22–29)
CREAT SERPL-MCNC: 0.5 MG/DL (ref 0.5–1)
DAT POLYSPECIFIC: NORMAL
DR. NOTIFY: NORMAL
EOSINOPHILS ABSOLUTE: 0.11 E9/L (ref 0.05–0.5)
EOSINOPHILS RELATIVE PERCENT: 0.8 % (ref 0–6)
GFR AFRICAN AMERICAN: >60
GFR NON-AFRICAN AMERICAN: >60 ML/MIN/1.73
GLUCOSE BLD-MCNC: 96 MG/DL (ref 74–99)
HCG(URINE) PREGNANCY TEST: NEGATIVE
HCT VFR BLD CALC: 28.7 % (ref 34–48)
HEMOGLOBIN: 8.5 G/DL (ref 11.5–15.5)
IMMATURE GRANULOCYTES #: 0.1 E9/L
IMMATURE GRANULOCYTES %: 0.8 % (ref 0–5)
LYMPHOCYTES ABSOLUTE: 1.71 E9/L (ref 1.5–4)
LYMPHOCYTES RELATIVE PERCENT: 13 % (ref 20–42)
MCH RBC QN AUTO: 25.1 PG (ref 26–35)
MCHC RBC AUTO-ENTMCNC: 29.6 % (ref 32–34.5)
MCV RBC AUTO: 84.9 FL (ref 80–99.9)
MONOCYTES ABSOLUTE: 0.85 E9/L (ref 0.1–0.95)
MONOCYTES RELATIVE PERCENT: 6.5 % (ref 2–12)
NEUTROPHILS ABSOLUTE: 10.35 E9/L (ref 1.8–7.3)
NEUTROPHILS RELATIVE PERCENT: 78.7 % (ref 43–80)
PDW BLD-RTO: 18.5 FL (ref 11.5–15)
PLATELET # BLD: 321 E9/L (ref 130–450)
PMV BLD AUTO: 10.5 FL (ref 7–12)
POTASSIUM SERPL-SCNC: 4 MMOL/L (ref 3.5–5)
RBC # BLD: 3.38 E12/L (ref 3.5–5.5)
SODIUM BLD-SCNC: 137 MMOL/L (ref 132–146)
URINE CULTURE, ROUTINE: NORMAL
WBC # BLD: 13.1 E9/L (ref 4.5–11.5)

## 2019-12-21 PROCEDURE — 86850 RBC ANTIBODY SCREEN: CPT

## 2019-12-21 PROCEDURE — 86900 BLOOD TYPING SEROLOGIC ABO: CPT

## 2019-12-21 PROCEDURE — 3600000003 HC SURGERY LEVEL 3 BASE: Performed by: SURGERY

## 2019-12-21 PROCEDURE — 2500000003 HC RX 250 WO HCPCS: Performed by: SURGERY

## 2019-12-21 PROCEDURE — 86901 BLOOD TYPING SEROLOGIC RH(D): CPT

## 2019-12-21 PROCEDURE — 87186 SC STD MICRODIL/AGAR DIL: CPT

## 2019-12-21 PROCEDURE — 3700000000 HC ANESTHESIA ATTENDED CARE: Performed by: SURGERY

## 2019-12-21 PROCEDURE — 86922 COMPATIBILITY TEST ANTIGLOB: CPT

## 2019-12-21 PROCEDURE — 7100000010 HC PHASE II RECOVERY - FIRST 15 MIN: Performed by: SURGERY

## 2019-12-21 PROCEDURE — 0JB80ZZ EXCISION OF ABDOMEN SUBCUTANEOUS TISSUE AND FASCIA, OPEN APPROACH: ICD-10-PCS | Performed by: SURGERY

## 2019-12-21 PROCEDURE — 2580000003 HC RX 258: Performed by: SURGERY

## 2019-12-21 PROCEDURE — 87070 CULTURE OTHR SPECIMN AEROBIC: CPT

## 2019-12-21 PROCEDURE — 6360000002 HC RX W HCPCS: Performed by: ANESTHESIOLOGY

## 2019-12-21 PROCEDURE — 6370000000 HC RX 637 (ALT 250 FOR IP): Performed by: INTERNAL MEDICINE

## 2019-12-21 PROCEDURE — 87075 CULTR BACTERIA EXCEPT BLOOD: CPT

## 2019-12-21 PROCEDURE — 81025 URINE PREGNANCY TEST: CPT

## 2019-12-21 PROCEDURE — 7100000011 HC PHASE II RECOVERY - ADDTL 15 MIN: Performed by: SURGERY

## 2019-12-21 PROCEDURE — 6360000002 HC RX W HCPCS: Performed by: SURGERY

## 2019-12-21 PROCEDURE — 2709999900 HC NON-CHARGEABLE SUPPLY: Performed by: SURGERY

## 2019-12-21 PROCEDURE — 86870 RBC ANTIBODY IDENTIFICATION: CPT

## 2019-12-21 PROCEDURE — 80048 BASIC METABOLIC PNL TOTAL CA: CPT

## 2019-12-21 PROCEDURE — 2580000003 HC RX 258: Performed by: NURSE ANESTHETIST, CERTIFIED REGISTERED

## 2019-12-21 PROCEDURE — 6360000002 HC RX W HCPCS: Performed by: NURSE ANESTHETIST, CERTIFIED REGISTERED

## 2019-12-21 PROCEDURE — 2500000003 HC RX 250 WO HCPCS: Performed by: NURSE ANESTHETIST, CERTIFIED REGISTERED

## 2019-12-21 PROCEDURE — 3600000013 HC SURGERY LEVEL 3 ADDTL 15MIN: Performed by: SURGERY

## 2019-12-21 PROCEDURE — 6370000000 HC RX 637 (ALT 250 FOR IP): Performed by: SURGERY

## 2019-12-21 PROCEDURE — 1200000000 HC SEMI PRIVATE

## 2019-12-21 PROCEDURE — 85025 COMPLETE CBC W/AUTO DIFF WBC: CPT

## 2019-12-21 PROCEDURE — 86880 COOMBS TEST DIRECT: CPT

## 2019-12-21 PROCEDURE — 3700000001 HC ADD 15 MINUTES (ANESTHESIA): Performed by: SURGERY

## 2019-12-21 PROCEDURE — 87205 SMEAR GRAM STAIN: CPT

## 2019-12-21 PROCEDURE — 36415 COLL VENOUS BLD VENIPUNCTURE: CPT

## 2019-12-21 RX ORDER — DIPHENHYDRAMINE HYDROCHLORIDE 50 MG/ML
12.5 INJECTION INTRAMUSCULAR; INTRAVENOUS
Status: DISCONTINUED | OUTPATIENT
Start: 2019-12-21 | End: 2019-12-21 | Stop reason: HOSPADM

## 2019-12-21 RX ORDER — MEPERIDINE HYDROCHLORIDE 25 MG/ML
12.5 INJECTION INTRAMUSCULAR; INTRAVENOUS; SUBCUTANEOUS EVERY 5 MIN PRN
Status: DISCONTINUED | OUTPATIENT
Start: 2019-12-21 | End: 2019-12-21 | Stop reason: HOSPADM

## 2019-12-21 RX ORDER — DEXAMETHASONE SODIUM PHOSPHATE 4 MG/ML
INJECTION, SOLUTION INTRA-ARTICULAR; INTRALESIONAL; INTRAMUSCULAR; INTRAVENOUS; SOFT TISSUE PRN
Status: DISCONTINUED | OUTPATIENT
Start: 2019-12-21 | End: 2019-12-21 | Stop reason: SDUPTHER

## 2019-12-21 RX ORDER — LIDOCAINE HYDROCHLORIDE 20 MG/ML
INJECTION, SOLUTION EPIDURAL; INFILTRATION; INTRACAUDAL; PERINEURAL PRN
Status: DISCONTINUED | OUTPATIENT
Start: 2019-12-21 | End: 2019-12-21 | Stop reason: SDUPTHER

## 2019-12-21 RX ORDER — ONDANSETRON 2 MG/ML
INJECTION INTRAMUSCULAR; INTRAVENOUS PRN
Status: DISCONTINUED | OUTPATIENT
Start: 2019-12-21 | End: 2019-12-21 | Stop reason: SDUPTHER

## 2019-12-21 RX ORDER — SODIUM CHLORIDE, SODIUM LACTATE, POTASSIUM CHLORIDE, CALCIUM CHLORIDE 600; 310; 30; 20 MG/100ML; MG/100ML; MG/100ML; MG/100ML
INJECTION, SOLUTION INTRAVENOUS CONTINUOUS PRN
Status: DISCONTINUED | OUTPATIENT
Start: 2019-12-21 | End: 2019-12-21 | Stop reason: SDUPTHER

## 2019-12-21 RX ORDER — PROPOFOL 10 MG/ML
INJECTION, EMULSION INTRAVENOUS PRN
Status: DISCONTINUED | OUTPATIENT
Start: 2019-12-21 | End: 2019-12-21 | Stop reason: SDUPTHER

## 2019-12-21 RX ORDER — MIDAZOLAM HYDROCHLORIDE 1 MG/ML
INJECTION INTRAMUSCULAR; INTRAVENOUS PRN
Status: DISCONTINUED | OUTPATIENT
Start: 2019-12-21 | End: 2019-12-21 | Stop reason: SDUPTHER

## 2019-12-21 RX ORDER — LIDOCAINE HYDROCHLORIDE AND EPINEPHRINE 10; 10 MG/ML; UG/ML
INJECTION, SOLUTION INFILTRATION; PERINEURAL PRN
Status: DISCONTINUED | OUTPATIENT
Start: 2019-12-21 | End: 2019-12-21 | Stop reason: HOSPADM

## 2019-12-21 RX ORDER — FENTANYL CITRATE 50 UG/ML
INJECTION, SOLUTION INTRAMUSCULAR; INTRAVENOUS PRN
Status: DISCONTINUED | OUTPATIENT
Start: 2019-12-21 | End: 2019-12-21 | Stop reason: SDUPTHER

## 2019-12-21 RX ADMIN — HYDROMORPHONE HYDROCHLORIDE 0.5 MG: 1 INJECTION, SOLUTION INTRAMUSCULAR; INTRAVENOUS; SUBCUTANEOUS at 13:28

## 2019-12-21 RX ADMIN — PROPOFOL 100 MG: 10 INJECTION, EMULSION INTRAVENOUS at 13:06

## 2019-12-21 RX ADMIN — LIDOCAINE HYDROCHLORIDE 100 MG: 20 INJECTION, SOLUTION EPIDURAL; INFILTRATION; INTRACAUDAL; PERINEURAL at 12:52

## 2019-12-21 RX ADMIN — ONDANSETRON HYDROCHLORIDE 4 MG: 2 INJECTION, SOLUTION INTRAMUSCULAR; INTRAVENOUS at 13:02

## 2019-12-21 RX ADMIN — PROPOFOL 50 MG: 10 INJECTION, EMULSION INTRAVENOUS at 13:08

## 2019-12-21 RX ADMIN — HYDROMORPHONE HYDROCHLORIDE 0.5 MG: 1 INJECTION, SOLUTION INTRAMUSCULAR; INTRAVENOUS; SUBCUTANEOUS at 13:48

## 2019-12-21 RX ADMIN — MIDAZOLAM 2 MG: 1 INJECTION INTRAMUSCULAR; INTRAVENOUS at 12:52

## 2019-12-21 RX ADMIN — FENTANYL CITRATE 100 MCG: 50 INJECTION, SOLUTION INTRAMUSCULAR; INTRAVENOUS at 12:52

## 2019-12-21 RX ADMIN — SODIUM CHLORIDE, POTASSIUM CHLORIDE, SODIUM LACTATE AND CALCIUM CHLORIDE: 600; 310; 30; 20 INJECTION, SOLUTION INTRAVENOUS at 17:22

## 2019-12-21 RX ADMIN — PROPOFOL 100 MG: 10 INJECTION, EMULSION INTRAVENOUS at 13:03

## 2019-12-21 RX ADMIN — HYDROMORPHONE HYDROCHLORIDE 0.5 MG: 1 INJECTION, SOLUTION INTRAMUSCULAR; INTRAVENOUS; SUBCUTANEOUS at 13:34

## 2019-12-21 RX ADMIN — PROPOFOL 100 MG: 10 INJECTION, EMULSION INTRAVENOUS at 12:52

## 2019-12-21 RX ADMIN — KETOROLAC TROMETHAMINE 15 MG: 30 INJECTION, SOLUTION INTRAMUSCULAR at 17:19

## 2019-12-21 RX ADMIN — LINEZOLID 600 MG: 600 TABLET, FILM COATED ORAL at 21:30

## 2019-12-21 RX ADMIN — PROPOFOL 100 MG: 10 INJECTION, EMULSION INTRAVENOUS at 12:57

## 2019-12-21 RX ADMIN — HYDROMORPHONE HYDROCHLORIDE 0.5 MG: 1 INJECTION, SOLUTION INTRAMUSCULAR; INTRAVENOUS; SUBCUTANEOUS at 13:42

## 2019-12-21 RX ADMIN — DEXAMETHASONE SODIUM PHOSPHATE 8 MG: 4 INJECTION, SOLUTION INTRAMUSCULAR; INTRAVENOUS at 13:02

## 2019-12-21 RX ADMIN — SODIUM CHLORIDE, POTASSIUM CHLORIDE, SODIUM LACTATE AND CALCIUM CHLORIDE: 600; 310; 30; 20 INJECTION, SOLUTION INTRAVENOUS at 12:39

## 2019-12-21 RX ADMIN — KETOROLAC TROMETHAMINE 15 MG: 30 INJECTION, SOLUTION INTRAMUSCULAR at 04:23

## 2019-12-21 RX ADMIN — KETOROLAC TROMETHAMINE 15 MG: 30 INJECTION, SOLUTION INTRAMUSCULAR at 23:21

## 2019-12-21 RX ADMIN — CEFEPIME HYDROCHLORIDE 2 G: 2 INJECTION, POWDER, FOR SOLUTION INTRAVENOUS at 12:46

## 2019-12-21 RX ADMIN — LINEZOLID 600 MG: 600 TABLET, FILM COATED ORAL at 08:50

## 2019-12-21 ASSESSMENT — PULMONARY FUNCTION TESTS
PIF_VALUE: 1
PIF_VALUE: 0
PIF_VALUE: 1

## 2019-12-21 ASSESSMENT — PAIN DESCRIPTION - ONSET: ONSET: ON-GOING

## 2019-12-21 ASSESSMENT — PAIN DESCRIPTION - PROGRESSION: CLINICAL_PROGRESSION: GRADUALLY IMPROVING

## 2019-12-21 ASSESSMENT — PAIN SCALES - GENERAL
PAINLEVEL_OUTOF10: 10
PAINLEVEL_OUTOF10: 9
PAINLEVEL_OUTOF10: 5
PAINLEVEL_OUTOF10: 7
PAINLEVEL_OUTOF10: 10
PAINLEVEL_OUTOF10: 8
PAINLEVEL_OUTOF10: 0
PAINLEVEL_OUTOF10: 2
PAINLEVEL_OUTOF10: 7
PAINLEVEL_OUTOF10: 10
PAINLEVEL_OUTOF10: 6
PAINLEVEL_OUTOF10: 10

## 2019-12-21 ASSESSMENT — PAIN DESCRIPTION - LOCATION: LOCATION: ABDOMEN

## 2019-12-21 ASSESSMENT — PAIN DESCRIPTION - PAIN TYPE: TYPE: SURGICAL PAIN

## 2019-12-21 ASSESSMENT — PAIN DESCRIPTION - ORIENTATION: ORIENTATION: RIGHT;LOWER

## 2019-12-21 ASSESSMENT — PAIN DESCRIPTION - DESCRIPTORS: DESCRIPTORS: BURNING

## 2019-12-21 ASSESSMENT — PAIN DESCRIPTION - FREQUENCY: FREQUENCY: CONTINUOUS

## 2019-12-21 NOTE — OP NOTE
Operative Note    Preop Dx: Wound infection at recent  incision    Postop Dx: same with undermining of 10 cm    Procedure: Excisional debridement 10 cm long x 4 cm deep of  incision and placement of wound VAC    Surgeon: Blair Nino MD    EBL: 5 mL    Complications: None    Findings: muscle and fascia intact    Disposition: PACU, stable    Indications:    53-year-old female status post recent  section with wound infection at incision site. Bedside incision and drainage was performed however patient continues to have purulent drainage and pain at incision site. Incision and drainage, possible debridement possible wound VAC was recommended. Risks, benefits, alternatives (and risks of alternatives) of surgery were discussed with the patient, which include but are not limited to, bleeding, infection, risk of further debridement, risk of cosmetic deformity, nerve injury, risk of anesthesia, blood clots, pneumonia, heart attack and stroke. Patient understands these risk and agrees to proceed. Details of Procedure:    Patient was taken to the operating room and placed in the supine position. MAC anesthesia was induced. Lower abdomen were prepped and draped in the sterile fashion. The previous I&D site was explored and was found to undermine medially and laterally. A 15 blade scalpel was used to enlarge the I&D site for 10 cm both medially and laterally along the  incision. A small amount of visual purulence was expressed and sent for culture. This area was then explored with my finger and fascia and muscle were intact and viable. There was some oozing from the musculature and hemostasis was obtained using electrocautery and hemostatic agent. Due to the size of the wound, a small VAC sponge wound VAC was placed in the standard fashion. All sponge, needle and instrumentation counts were correct at the end of the procedure.  Patient tolerated the procedure well and was taken

## 2019-12-21 NOTE — PROGRESS NOTES
[address] [place] [address] [facility]  Progress Note    SUBJECTIVE:  Chief Complaint   Patient presents with    Post-op Problem     Had  Dec 8th and having pain at incision, saw her OB today      Patient is tolerating medications. No reported adverse drug reactions. No nausea, vomiting, diarrhea. Review of systems:  As stated above in the chief complaint, otherwise negative. Medications:  Scheduled Meds:   linezolid  600 mg Oral 2 times per day    cefepime  2 g Intravenous Q12H    ketorolac  15 mg Intravenous Q6H     Continuous Infusions:   lactated ringers 125 mL/hr at 19 1803     PRN Meds:HYDROcodone 5 mg - acetaminophen **OR** HYDROcodone 5 mg - acetaminophen, morphine    OBJECTIVE:  /65   Pulse 82   Temp 98.8 °F (37.1 °C) (Oral)   Resp 16   Ht 5' 2.5\" (1.588 m)   Wt 213 lb (96.6 kg)   LMP 2019   SpO2 98%   Breastfeeding? Unknown   BMI 38.34 kg/m²   Temp  Av.7 °F (37.6 °C)  Min: 98.1 °F (36.7 °C)  Max: 102.9 °F (39.4 °C)  Constitutional: The patient is awake, alert, and oriented. Skin: Warm and dry. No rashes were noted. HEENT: Round and reactive pupils. Moist mucous membranes. No ulcerations or thrush. Neck: Supple to movements. Chest: No use of accessory muscles to breathe. Symmetrical expansion. No wheezing, crackles or rhonchi. Cardiovascular: S1 and S2 are rhythmic and regular. No murmurs appreciated. Abdomen: Positive bowel sounds to auscultation. Benign to palpation. No masses felt. No hepatosplenomegaly. abd is with dressing, little blood tinged, less erythema surrounding. Extremities: No clubbing, no cyanosis, no edema.   Lines: peripheral    Laboratory and Tests Review:  Lab Results   Component Value Date    WBC 13.1 (H) 2019    WBC 13.9 (H) 2019    WBC 15.6 (H) 2019    HGB 8.5 (L) 2019    HCT 28.7 (L) 2019    MCV 84.9 2019     2019     Lab Results   Component Value Date ,000 CFU/mL  Mixed juwan isolated. Further workup and sensitivity testing  is not routinely indicated and will not be performed. Mixed juwan isolated includes:  Mixed gram positive organisms       No results found for: MRSAC    Assessment  · Cellulitis/abscess of the abdominal wall -surgical site infection of  wound  · 3 weeks post op   · Vancomycin reaction, red man syndrome  · Fever  · leukocytosis     Plan  · zyvox and IV cefepime day 2  ·  I&D surgery, bedside done   · Ct abd done  · Obtain wound and anaerobic cultures from drainage--GPC clusters so far  · For OR ? Denisse Ades  9:41 AM  2019     Staph aureus and GNR from previous cultures, final abx to be decided later    Pt seen and examined. I discussed and co-formulated the plan with advanced practice nurse. Labs, cultures, and radiographs reviewed. Face to Face encounter occurred. Changes made as necessary by me.      Mina Sahu MD  Infectious Disease

## 2019-12-22 LAB
ANAEROBIC CULTURE: NORMAL
BASOPHILS ABSOLUTE: 0.01 E9/L (ref 0–0.2)
BASOPHILS RELATIVE PERCENT: 0.1 % (ref 0–2)
EOSINOPHILS ABSOLUTE: 0.01 E9/L (ref 0.05–0.5)
EOSINOPHILS RELATIVE PERCENT: 0.1 % (ref 0–6)
GRAM STAIN ORDERABLE: NORMAL
GRAM STAIN RESULT: ABNORMAL
HCT VFR BLD CALC: 26.2 % (ref 34–48)
HEMOGLOBIN: 7.9 G/DL (ref 11.5–15.5)
IMMATURE GRANULOCYTES #: 0.06 E9/L
IMMATURE GRANULOCYTES %: 0.7 % (ref 0–5)
LYMPHOCYTES ABSOLUTE: 1.31 E9/L (ref 1.5–4)
LYMPHOCYTES RELATIVE PERCENT: 14.9 % (ref 20–42)
MCH RBC QN AUTO: 25.5 PG (ref 26–35)
MCHC RBC AUTO-ENTMCNC: 30.2 % (ref 32–34.5)
MCV RBC AUTO: 84.5 FL (ref 80–99.9)
MONOCYTES ABSOLUTE: 0.57 E9/L (ref 0.1–0.95)
MONOCYTES RELATIVE PERCENT: 6.5 % (ref 2–12)
MRSA CULTURE ONLY: NORMAL
NEUTROPHILS ABSOLUTE: 6.81 E9/L (ref 1.8–7.3)
NEUTROPHILS RELATIVE PERCENT: 77.7 % (ref 43–80)
ORGANISM: ABNORMAL
ORGANISM: ABNORMAL
PDW BLD-RTO: 18.1 FL (ref 11.5–15)
PLATELET # BLD: 268 E9/L (ref 130–450)
PMV BLD AUTO: 10.7 FL (ref 7–12)
RBC # BLD: 3.1 E12/L (ref 3.5–5.5)
WBC # BLD: 8.8 E9/L (ref 4.5–11.5)
WOUND/ABSCESS: ABNORMAL
WOUND/ABSCESS: ABNORMAL

## 2019-12-22 PROCEDURE — 2580000003 HC RX 258: Performed by: SURGERY

## 2019-12-22 PROCEDURE — 2500000003 HC RX 250 WO HCPCS: Performed by: INTERNAL MEDICINE

## 2019-12-22 PROCEDURE — 85025 COMPLETE CBC W/AUTO DIFF WBC: CPT

## 2019-12-22 PROCEDURE — 6370000000 HC RX 637 (ALT 250 FOR IP): Performed by: SURGERY

## 2019-12-22 PROCEDURE — 36415 COLL VENOUS BLD VENIPUNCTURE: CPT

## 2019-12-22 PROCEDURE — 05HA33Z INSERTION OF INFUSION DEVICE INTO LEFT BRACHIAL VEIN, PERCUTANEOUS APPROACH: ICD-10-PCS | Performed by: OBSTETRICS & GYNECOLOGY

## 2019-12-22 PROCEDURE — 6360000002 HC RX W HCPCS: Performed by: SURGERY

## 2019-12-22 PROCEDURE — 76937 US GUIDE VASCULAR ACCESS: CPT

## 2019-12-22 PROCEDURE — 1200000000 HC SEMI PRIVATE

## 2019-12-22 PROCEDURE — C1751 CATH, INF, PER/CENT/MIDLINE: HCPCS

## 2019-12-22 PROCEDURE — 6360000002 HC RX W HCPCS: Performed by: INTERNAL MEDICINE

## 2019-12-22 PROCEDURE — 2580000003 HC RX 258: Performed by: INTERNAL MEDICINE

## 2019-12-22 PROCEDURE — 36410 VNPNXR 3YR/> PHY/QHP DX/THER: CPT

## 2019-12-22 RX ORDER — SODIUM CHLORIDE 0.9 % (FLUSH) 0.9 %
10 SYRINGE (ML) INJECTION PRN
Status: DISCONTINUED | OUTPATIENT
Start: 2019-12-22 | End: 2019-12-23 | Stop reason: HOSPADM

## 2019-12-22 RX ADMIN — Medication 100 UNITS: at 20:37

## 2019-12-22 RX ADMIN — LIDOCAINE HYDROCHLORIDE 1 ML: 10 INJECTION, SOLUTION EPIDURAL; INFILTRATION; INTRACAUDAL; PERINEURAL at 16:01

## 2019-12-22 RX ADMIN — CEFEPIME HYDROCHLORIDE 2 G: 2 INJECTION, POWDER, FOR SOLUTION INTRAVENOUS at 00:45

## 2019-12-22 RX ADMIN — SODIUM CHLORIDE, POTASSIUM CHLORIDE, SODIUM LACTATE AND CALCIUM CHLORIDE: 600; 310; 30; 20 INJECTION, SOLUTION INTRAVENOUS at 01:41

## 2019-12-22 RX ADMIN — Medication 10 ML: at 20:37

## 2019-12-22 RX ADMIN — KETOROLAC TROMETHAMINE 15 MG: 30 INJECTION, SOLUTION INTRAMUSCULAR at 05:15

## 2019-12-22 RX ADMIN — Medication 10 ML: at 16:39

## 2019-12-22 RX ADMIN — SODIUM CHLORIDE, POTASSIUM CHLORIDE, SODIUM LACTATE AND CALCIUM CHLORIDE: 600; 310; 30; 20 INJECTION, SOLUTION INTRAVENOUS at 10:36

## 2019-12-22 RX ADMIN — KETOROLAC TROMETHAMINE 15 MG: 30 INJECTION, SOLUTION INTRAMUSCULAR at 12:17

## 2019-12-22 RX ADMIN — SODIUM CHLORIDE, POTASSIUM CHLORIDE, SODIUM LACTATE AND CALCIUM CHLORIDE: 600; 310; 30; 20 INJECTION, SOLUTION INTRAVENOUS at 16:39

## 2019-12-22 RX ADMIN — LINEZOLID 600 MG: 600 TABLET, FILM COATED ORAL at 08:59

## 2019-12-22 RX ADMIN — Medication 20 ML: at 16:03

## 2019-12-22 RX ADMIN — CEFEPIME HYDROCHLORIDE 2 G: 2 INJECTION, POWDER, FOR SOLUTION INTRAVENOUS at 12:18

## 2019-12-22 RX ADMIN — MORPHINE SULFATE 2 MG: 2 INJECTION, SOLUTION INTRAMUSCULAR; INTRAVENOUS at 01:41

## 2019-12-22 RX ADMIN — KETOROLAC TROMETHAMINE 15 MG: 30 INJECTION, SOLUTION INTRAMUSCULAR at 16:39

## 2019-12-22 ASSESSMENT — PAIN DESCRIPTION - PROGRESSION
CLINICAL_PROGRESSION: NOT CHANGED
CLINICAL_PROGRESSION: GRADUALLY IMPROVING

## 2019-12-22 ASSESSMENT — PAIN DESCRIPTION - PAIN TYPE
TYPE: SURGICAL PAIN
TYPE: SURGICAL PAIN

## 2019-12-22 ASSESSMENT — PAIN SCALES - GENERAL
PAINLEVEL_OUTOF10: 10
PAINLEVEL_OUTOF10: 0
PAINLEVEL_OUTOF10: 0
PAINLEVEL_OUTOF10: 7
PAINLEVEL_OUTOF10: 7
PAINLEVEL_OUTOF10: 0
PAINLEVEL_OUTOF10: 7

## 2019-12-22 ASSESSMENT — PAIN - FUNCTIONAL ASSESSMENT
PAIN_FUNCTIONAL_ASSESSMENT: PREVENTS OR INTERFERES SOME ACTIVE ACTIVITIES AND ADLS
PAIN_FUNCTIONAL_ASSESSMENT: PREVENTS OR INTERFERES SOME ACTIVE ACTIVITIES AND ADLS

## 2019-12-22 ASSESSMENT — PAIN DESCRIPTION - LOCATION
LOCATION: ABDOMEN
LOCATION: ABDOMEN

## 2019-12-22 ASSESSMENT — PAIN DESCRIPTION - FREQUENCY
FREQUENCY: CONTINUOUS
FREQUENCY: CONTINUOUS

## 2019-12-22 ASSESSMENT — PAIN DESCRIPTION - DESCRIPTORS
DESCRIPTORS: BURNING
DESCRIPTORS: ACHING;BURNING

## 2019-12-22 ASSESSMENT — PAIN DESCRIPTION - ORIENTATION
ORIENTATION: RIGHT;LOWER
ORIENTATION: RIGHT;LOWER

## 2019-12-22 ASSESSMENT — PAIN DESCRIPTION - ONSET
ONSET: ON-GOING
ONSET: ON-GOING

## 2019-12-22 NOTE — PROGRESS NOTES
General Surgery Progress Note    Surgeon:  Dr. Black Engle  Subjective:   Pain:    Improving  Diet:    Tolerated  Nausea: None    /78   Pulse 56   Temp 97.6 °F (36.4 °C) (Oral)   Resp 16   Ht 5' 2.5\" (1.588 m)   Wt 214 lb 4.8 oz (97.2 kg)   LMP 2019   SpO2 98%   Breastfeeding?  Unknown   BMI 38.57 kg/m²      General:  no acute distress  Lungs:  non-labored breathing  Heart:   Pulse is regular  Abdomen:  soft, appropriate TTP, incisions c/d/I with VAC in place, nondistended, no guarding/rebound tenderness    ASSESSMENT / PLAN:   · POD1 excisional debridment and wound vac placement for  incision abscess  · Wound VAC change tomorrow    Justa Hale MD  2019  1:30 PM

## 2019-12-22 NOTE — PROCEDURES
MIDLINE  Catheter insertion date 12/22/2019  1603   Product Number:  [id number]   Lot No: [id number]   Gauge: 4.5FR   Lumen: SINGLE   L Basilic    Vein Diameter : 0.40CM   Upper arm circumference (10CM ABOVE AC): 33CM   Catheter Length : 15CM                    Internal Length: 11CM   Exposed Catheter Length: 4CM   Ultrasound Used:YES  : Shayy Cabrera RN, CPUI, [facility]

## 2019-12-23 VITALS
BODY MASS INDEX: 39.94 KG/M2 | WEIGHT: 225.4 LBS | DIASTOLIC BLOOD PRESSURE: 84 MMHG | HEART RATE: 60 BPM | OXYGEN SATURATION: 99 % | SYSTOLIC BLOOD PRESSURE: 129 MMHG | RESPIRATION RATE: 16 BRPM | HEIGHT: 63 IN | TEMPERATURE: 97.5 F

## 2019-12-23 PROBLEM — R76.8 RED BLOOD CELL ANTIBODY POSITIVE: Chronic | Status: ACTIVE | Noted: 2019-12-23

## 2019-12-23 LAB
ANAEROBIC CULTURE: NORMAL
BASOPHILS ABSOLUTE: 0.02 E9/L (ref 0–0.2)
BASOPHILS RELATIVE PERCENT: 0.3 % (ref 0–2)
CULTURE SURGICAL: ABNORMAL
EOSINOPHILS ABSOLUTE: 0.05 E9/L (ref 0.05–0.5)
EOSINOPHILS RELATIVE PERCENT: 0.7 % (ref 0–6)
HCT VFR BLD CALC: 27 % (ref 34–48)
HEMOGLOBIN: 7.9 G/DL (ref 11.5–15.5)
IMMATURE GRANULOCYTES #: 0.14 E9/L
IMMATURE GRANULOCYTES %: 1.8 % (ref 0–5)
LYMPHOCYTES ABSOLUTE: 1.98 E9/L (ref 1.5–4)
LYMPHOCYTES RELATIVE PERCENT: 26.2 % (ref 20–42)
MCH RBC QN AUTO: 24.9 PG (ref 26–35)
MCHC RBC AUTO-ENTMCNC: 29.3 % (ref 32–34.5)
MCV RBC AUTO: 85.2 FL (ref 80–99.9)
MONOCYTES ABSOLUTE: 0.56 E9/L (ref 0.1–0.95)
MONOCYTES RELATIVE PERCENT: 7.4 % (ref 2–12)
NEUTROPHILS ABSOLUTE: 4.82 E9/L (ref 1.8–7.3)
NEUTROPHILS RELATIVE PERCENT: 63.6 % (ref 43–80)
ORGANISM: ABNORMAL
PDW BLD-RTO: 18.1 FL (ref 11.5–15)
PLATELET # BLD: 329 E9/L (ref 130–450)
PMV BLD AUTO: 10.3 FL (ref 7–12)
RBC # BLD: 3.17 E12/L (ref 3.5–5.5)
WBC # BLD: 7.6 E9/L (ref 4.5–11.5)

## 2019-12-23 PROCEDURE — 6370000000 HC RX 637 (ALT 250 FOR IP): Performed by: SURGERY

## 2019-12-23 PROCEDURE — 2580000003 HC RX 258: Performed by: SURGERY

## 2019-12-23 PROCEDURE — 6360000002 HC RX W HCPCS: Performed by: SURGERY

## 2019-12-23 PROCEDURE — 36415 COLL VENOUS BLD VENIPUNCTURE: CPT

## 2019-12-23 PROCEDURE — 36410 VNPNXR 3YR/> PHY/QHP DX/THER: CPT

## 2019-12-23 PROCEDURE — 6360000002 HC RX W HCPCS: Performed by: INTERNAL MEDICINE

## 2019-12-23 PROCEDURE — 76937 US GUIDE VASCULAR ACCESS: CPT

## 2019-12-23 PROCEDURE — 85025 COMPLETE CBC W/AUTO DIFF WBC: CPT

## 2019-12-23 RX ORDER — OXYCODONE HYDROCHLORIDE AND ACETAMINOPHEN 5; 325 MG/1; MG/1
1 TABLET ORAL EVERY 6 HOURS PRN
Qty: 28 TABLET | Refills: 0
Start: 2019-12-23 | End: 2019-12-30

## 2019-12-23 RX ORDER — CEFEPIME HYDROCHLORIDE 2 G/1
2 INJECTION, POWDER, FOR SOLUTION INTRAVENOUS EVERY 12 HOURS
Qty: 40 G | Refills: 0 | Status: SHIPPED | OUTPATIENT
Start: 2019-12-23 | End: 2020-01-02

## 2019-12-23 RX ADMIN — KETOROLAC TROMETHAMINE 15 MG: 30 INJECTION, SOLUTION INTRAMUSCULAR at 00:10

## 2019-12-23 RX ADMIN — CEFEPIME HYDROCHLORIDE 2 G: 2 INJECTION, POWDER, FOR SOLUTION INTRAVENOUS at 12:15

## 2019-12-23 RX ADMIN — KETOROLAC TROMETHAMINE 15 MG: 30 INJECTION, SOLUTION INTRAMUSCULAR at 17:27

## 2019-12-23 RX ADMIN — HYDROCODONE BITARTRATE AND ACETAMINOPHEN 1 TABLET: 5; 325 TABLET ORAL at 00:10

## 2019-12-23 RX ADMIN — Medication 100 UNITS: at 17:36

## 2019-12-23 RX ADMIN — HYDROCODONE BITARTRATE AND ACETAMINOPHEN 2 TABLET: 5; 325 TABLET ORAL at 08:11

## 2019-12-23 RX ADMIN — KETOROLAC TROMETHAMINE 15 MG: 30 INJECTION, SOLUTION INTRAMUSCULAR at 12:13

## 2019-12-23 RX ADMIN — KETOROLAC TROMETHAMINE 15 MG: 30 INJECTION, SOLUTION INTRAMUSCULAR at 05:33

## 2019-12-23 RX ADMIN — CEFEPIME HYDROCHLORIDE 2 G: 2 INJECTION, POWDER, FOR SOLUTION INTRAVENOUS at 00:14

## 2019-12-23 ASSESSMENT — PAIN SCALES - GENERAL
PAINLEVEL_OUTOF10: 7
PAINLEVEL_OUTOF10: 3
PAINLEVEL_OUTOF10: 7
PAINLEVEL_OUTOF10: 4
PAINLEVEL_OUTOF10: 7
PAINLEVEL_OUTOF10: 6
PAINLEVEL_OUTOF10: 8
PAINLEVEL_OUTOF10: 6

## 2019-12-23 ASSESSMENT — PAIN DESCRIPTION - PROGRESSION
CLINICAL_PROGRESSION: GRADUALLY WORSENING
CLINICAL_PROGRESSION: NOT CHANGED

## 2019-12-23 ASSESSMENT — PAIN DESCRIPTION - PAIN TYPE
TYPE: SURGICAL PAIN
TYPE: SURGICAL PAIN

## 2019-12-23 ASSESSMENT — PAIN DESCRIPTION - FREQUENCY
FREQUENCY: CONTINUOUS
FREQUENCY: CONTINUOUS

## 2019-12-23 ASSESSMENT — PAIN DESCRIPTION - ORIENTATION
ORIENTATION: LOWER;MID;RIGHT
ORIENTATION: RIGHT;LOWER;UPPER

## 2019-12-23 ASSESSMENT — PAIN DESCRIPTION - ONSET
ONSET: ON-GOING
ONSET: ON-GOING

## 2019-12-23 ASSESSMENT — PAIN DESCRIPTION - LOCATION
LOCATION: ABDOMEN
LOCATION: ABDOMEN

## 2019-12-23 ASSESSMENT — PAIN DESCRIPTION - DESCRIPTORS
DESCRIPTORS: ACHING;DISCOMFORT
DESCRIPTORS: SORE;STABBING

## 2019-12-23 NOTE — PROGRESS NOTES
Call placed to Dr. Sierra Mack regarding dc.   Electronically signed by Demetrio Wyman RN on 12/23/2019 at 5:11 PM

## 2019-12-23 NOTE — PROGRESS NOTES
General Surgery Progress Note    Surgeon:  Dr. Vijaya Bloom  Subjective:   Pain:    Improving  Diet:    Tolerated  Nausea: None    /81   Pulse 71   Temp 97.9 °F (36.6 °C) (Oral)   Resp 16   Ht 5' 2.5\" (1.588 m)   Wt 225 lb 6.4 oz (102.2 kg)   LMP 2019   SpO2 97%   Breastfeeding?  Unknown   BMI 40.57 kg/m²      General:  no acute distress  Lungs:  non-labored breathing  Heart:   Pulse is regular  Abdomen:  soft, appropriate TTP, incisions c/d/I with VAC in place, nondistended, no guarding/rebound tenderness    ASSESSMENT / PLAN:   · POD2 excisional debridment and wound vac placement for  incision abscess  · Wound VAC change today  · Ok to d/c once VAC changed and home health care set up  · F/u in my office in 1-2 weeks    Checo Grant MD  2019  12:13 PM

## 2019-12-23 NOTE — LACTATION NOTE
Mom pumping and sending colostrum to the baby. Encouraged to maintain pumping routine. More storage bottles given. Pt. Is on Maxipime (L2), which is safe with breastfeeding. Encouraged to call with any needs.

## 2019-12-23 NOTE — CARE COORDINATION
Received notice from Shlomo at [facility] that wound vac application has been received, processed and approved for device delivery. Vac would not be able to be delivered here to [facility] until very late this evening, approx 9 PM.  Discussed with patient who is agreeable to discharge home with a wet to dry dressing per order and have vac delivered to her home 62/19/33 with re-application by [facility] on nursing visit. Kelsey with [facility] made aware of plan and agreeable. Grey at [facility] will redirect shipping address to patient's home. Script for IV atb sent via fx to [address]  Orders for [facility] noted  Charge nurse aware. Will seek DC order from Dr. Marguerite Gabriel.  Gasper, MSN, RN  [facility] Case Management  [phone]

## 2019-12-23 NOTE — CARE COORDINATION
Orders for wound vac therapy noted. Application completed with exception of physician signature. Same faxed to office of Dr. Rama Kothari [phone] for signature. Staff at office verbalized he will be in office today. Will fax to [facility] on receipt of signed application from physician's office. Celina Lugo.  Gasper, MSN, RN  [facility] Case Management  [phone]

## 2019-12-23 NOTE — CARE COORDINATION
Signature on wound vac application received from Dr. Sal Howard office. Application along with op notes and consults faxed to [facility]. Will follow for response and delivery. Application is placed in lite chart should it be needed. Princess Houser.  Gasper, MSN, RN  [facility] Case Management  [phone]

## 2019-12-23 NOTE — PROGRESS NOTES
Infectious disease     Progress note        Date:   2019  Patient name:  Nikki Mott  Date of admission:  2019 12:15 PM  MRN:   [id number]  YOB: 1985    Reason for Consult:  Abdominal wall cellulitis    Consulting Physician:  Alfreda Alarcon DO    CC:   Chief Complaint   Patient presents with    Post-op Problem     Had  Dec 8th and having pain at incision, saw her OB today        Little abd tenderness, no nausea or diarrhea now, tolerating medication. Past Medical History:   has a past medical history of Gestational diabetes mellitus (GDM) affecting pregnancy. Past Surgical History:   has a past surgical history that includes [place] tooth extraction;  section (N/A, 2019); Insert Midline Catheter (2019); and Abdomen surgery (Right, 2019). Home Medications:    Prior to Admission medications    Medication Sig Start Date End Date Taking? Authorizing Provider   ibuprofen (ADVIL;MOTRIN) 800 MG tablet Take 1 tablet by mouth every 8 hours 19  Yes Génesis Lagunas MD   ferrous sulfate 325 (65 Fe) MG tablet Take 1 tablet by mouth 2 times daily (with meals)  Patient taking differently: Take 325 mg by mouth daily (with breakfast)  19  Yes Génesis Lagunas MD   docusate sodium (COLACE, DULCOLAX) 100 MG CAPS Take 100 mg by mouth 2 times daily  Patient taking differently: Take 100 mg by mouth 2 times daily as needed  19   Génesis Lagunas MD   lanolin ointment Apply topically as needed. 19   Génesis Lagunas MD   simethicone (MYLICON) 80 MG chewable tablet Take 1 tablet by mouth every 6 hours as needed for Flatulence 19   Génesis Lagunas MD       Allergies:  Pcn [penicillins] and Vancomycin    Social History:   reports that she quit smoking about a year ago. Her smoking use included cigarettes. She started smoking about 17 years ago. She smoked 0.50 packs per day. She has never used smokeless tobacco. She reports previous drug use.

## 2019-12-23 NOTE — PLAN OF CARE
Problem: Pain:  Goal: Pain level will decrease  Description  Pain level will decrease     Pain level will decrease  12/23/2019 0106 by Barrett Parson RN  Outcome: Met This Shift  12/22/2019 1413 by Mortimer Romney, RN  Outcome: Met This Shift     Problem: Pain:  Goal: Control of acute pain  Description  Control of acute pain  12/23/2019 0106 by Barrett Parson RN  Outcome: Met This Shift  12/22/2019 1413 by Mortimer Romney, RN  Outcome: Met This Shift     Problem: Skin Integrity - Impaired:  Goal: Skin will be intact without erythema or breakdown  12/23/2019 0106 by Barrett Parson RN  Outcome: Ongoing  12/22/2019 1413 by Mortimer Romney, RN  Outcome: Met This Shift     Problem: Pain:  Goal: Pain level will decrease  Description  Pain level will decrease     Pain level will decrease  12/23/2019 0106 by Barrett Parson RN  Outcome: Met This Shift  12/22/2019 1413 by Mortimer Romney, RN  Outcome: Met This Shift     Problem: FALL RISK  Goal: Absence of falls  12/23/2019 0106 by Barrett Parson RN  Outcome: Met This Shift  12/22/2019 1413 by Mortimer Romney, RN  Outcome: Met This Shift

## 2019-12-23 NOTE — PROGRESS NOTES
Infectious disease     Progress note        Date:   2019  Patient name:  Lelan Holstein  Date of admission:  2019 12:15 PM  MRN:   [id number]  YOB: 1985    Reason for Consult:  Abdominal wall cellulitis    Consulting Physician:  Zeinab Montiel DO    CC:   Chief Complaint   Patient presents with    Post-op Problem     Had  Dec 8th and having pain at incision, saw her OB today        Little abd tenderness, no nausea or diarrhea now, tolerating medication. Past Medical History:   has a past medical history of Gestational diabetes mellitus (GDM) affecting pregnancy. Past Surgical History:   has a past surgical history that includes [place] tooth extraction;  section (N/A, 2019); Insert Midline Catheter (2019); and Abdomen surgery (Right, 2019). Home Medications:    Prior to Admission medications    Medication Sig Start Date End Date Taking? Authorizing Provider   ceFEPIme (MAXIPIME) 2 g injection Infuse 2 g intravenously every 12 hours for 10 days 19 Yes Alfredito Hanley APRN - CNS   ibuprofen (ADVIL;MOTRIN) 800 MG tablet Take 1 tablet by mouth every 8 hours 19  Yes Meet Gutierrez MD   ferrous sulfate 325 (65 Fe) MG tablet Take 1 tablet by mouth 2 times daily (with meals)  Patient taking differently: Take 325 mg by mouth daily (with breakfast)  19  Yes Meet Gutierrez MD   docusate sodium (COLACE, DULCOLAX) 100 MG CAPS Take 100 mg by mouth 2 times daily  Patient taking differently: Take 100 mg by mouth 2 times daily as needed  19   Meet Gutierrez MD   lanolin ointment Apply topically as needed. 19   Meet Gutierrez MD   simethicone (MYLICON) 80 MG chewable tablet Take 1 tablet by mouth every 6 hours as needed for Flatulence 19   Meet Gutierrez MD       Allergies:  Pcn [penicillins] and Vancomycin    Social History:   reports that she quit smoking about a year ago. Her smoking use included cigarettes.  She started smoking about 17 years ago. She smoked 0.50 packs per day. She has never used smokeless tobacco. She reports previous drug use. Frequency: 21.00 times per week. Drug: Marijuana. She reports that she does not drink alcohol. Family History: family history is not on file. She was adopted. REVIEW OF SYSTEMS:    10 ROS otherwise negative unless otherwise specified in the HPI        PHYSICAL EXAM:    /75   Pulse 65   Temp 98.9 °F (37.2 °C) (Oral)   Resp 16   Ht 5' 2.5\" (1.588 m)   Wt 225 lb 6.4 oz (102.2 kg)   LMP 03/20/2019   SpO2 98%   Breastfeeding? Unknown   BMI 40.57 kg/m²    General appearance: sleepy for my exam again   Skin: Warm and dry  Eyes: Pink palpebral conjunctivae. PERRL  Ears: External ears normal. No tragal tenderness. Nose/Sinuses: Nares normal. Septum midline. Mucosa normal. No sinus tenderness. Oropharynx: Oropharynx clear with no exudates seen  Neck: Neck supple. No jugular venous distension, lymphadenopathy or thyromegaly Trachea midline  Lungs: Lungs clear to auscultation bilaterally. No rhonchi, crackles or wheezes  Heart: S1 S2  Regular rate and rhythm. No rub, murmur or gallop  Abdomen: Abdomen soft, there is tender to palpation around the c section incision, wound vac on. Extremities: No edema, Peripheral pulses palpable  Musculoskeletal: Muscular strength appears intact. No joint effusion, tenderness, swelling or warmth      DATA:    Labs:   CBC:   Recent Labs     12/22/19  0447 12/23/19  0315   WBC 8.8 7.6   HGB 7.9* 7.9*   HCT 26.2* 27.0*    329     BMP:   Recent Labs     12/21/19  0417      K 4.0   CO2 23   BUN 14   CREATININE 0.5   LABGLOM >60   GLUCOSE 96     BNP: No results for input(s): BNP in the last 72 hours. PT/INR: No results for input(s): PROTIME, INR in the last 72 hours. APTT:No results for input(s): APTT in the last 72 hours.   CARDIAC ENZYMES:No results for input(s): CKTOTAL, CKMB, CKMBINDEX, TROPONINI in the last 72 hours.  FASTING LIPID PANEL:No results found for: HDL, LDLDIRECT, LDLCALC, TRIG  LIVER PROFILE:  No results for input(s): AST, ALT, LABALBU in the last 72 hours. Assessment and Plan:      Patient Active Problem List   Diagnosis    Insulin controlled gestational diabetes mellitus (GDM) in third trimester    Obesity affecting pregnancy in third trimester    Elderly multigravida, third trimester    Excessive fetal growth affecting management of mother in almazan pregnancy in third trimester, antepartum    Polyhydramnios in third trimester    36 weeks gestation of pregnancy    Third trimester pregnancy at less than 36 weeks    Breech presentation, no version    Alteration in comfort associated with uterine contractions    Cellulitis       Assessment  · Cellulitis/abscess of the abdominal wall -surgical site infection of  wound  · 3 weeks post op   · Vancomycin reaction, red man syndrome  · S/p I&d of C section in OR 19  · Leukocytosis improved     S/p excisional debridment and wound vac placement for  incision absce    Plan  · Cefepime now, off zyvox ( cx MSSA and enterobacter cloacae - so far)--maybe narrow  · surgey on case  · Follow wound and anaerobic cultures from drainage    Midline cefepime for 14 days, 10 more days  DC when ready     Pt seen and examined. I discussed and co-formulated the plan with advanced practice nurse. Labs, cultures, and radiographs reviewed. Face to Face encounter occurred. Changes made as necessary by me.      Barrington Suarez MD  Infectious Disease

## 2019-12-24 LAB
BLOOD BANK DISPENSE STATUS: NORMAL
BLOOD BANK PRODUCT CODE: NORMAL
BLOOD CULTURE, ROUTINE: NORMAL
BPU ID: NORMAL
CULTURE, BLOOD 2: NORMAL
DESCRIPTION BLOOD BANK: NORMAL

## 2019-12-27 ENCOUNTER — HOSPITAL ENCOUNTER (OUTPATIENT)
Age: 34
Discharge: HOME | End: 2019-12-29
Payer: COMMERCIAL

## 2019-12-27 LAB
ALBUMIN SERPL-MCNC: 3.7 G/DL (ref 3.5–5.2)
ALP BLD-CCNC: 121 U/L (ref 35–104)
ALT SERPL-CCNC: 21 U/L (ref 0–32)
ANION GAP SERPL CALCULATED.3IONS-SCNC: 13 MMOL/L (ref 7–16)
ANISOCYTOSIS: ABNORMAL
AST SERPL-CCNC: 19 U/L (ref 0–31)
BASOPHILS ABSOLUTE: 0.04 E9/L (ref 0–0.2)
BASOPHILS RELATIVE PERCENT: 0.7 % (ref 0–2)
BILIRUB SERPL-MCNC: <0.2 MG/DL (ref 0–1.2)
BUN BLDV-MCNC: 17 MG/DL (ref 6–20)
C-REACTIVE PROTEIN: 1.4 MG/DL (ref 0–0.4)
CALCIUM SERPL-MCNC: 8.8 MG/DL (ref 8.6–10.2)
CHLORIDE BLD-SCNC: 107 MMOL/L (ref 98–107)
CO2: 20 MMOL/L (ref 22–29)
CREAT SERPL-MCNC: 0.5 MG/DL (ref 0.5–1)
EOSINOPHILS ABSOLUTE: 0.1 E9/L (ref 0.05–0.5)
EOSINOPHILS RELATIVE PERCENT: 1.7 % (ref 0–6)
GFR AFRICAN AMERICAN: >60
GFR NON-AFRICAN AMERICAN: >60 ML/MIN/1.73
GLUCOSE BLD-MCNC: 83 MG/DL (ref 74–99)
HCT VFR BLD CALC: 35.3 % (ref 34–48)
HEMOGLOBIN: 10.2 G/DL (ref 11.5–15.5)
HYPOCHROMIA: ABNORMAL
IMMATURE GRANULOCYTES #: 0.09 E9/L
IMMATURE GRANULOCYTES %: 1.5 % (ref 0–5)
LYMPHOCYTES ABSOLUTE: 1.58 E9/L (ref 1.5–4)
LYMPHOCYTES RELATIVE PERCENT: 27.1 % (ref 20–42)
MCH RBC QN AUTO: 24.2 PG (ref 26–35)
MCHC RBC AUTO-ENTMCNC: 28.9 % (ref 32–34.5)
MCV RBC AUTO: 83.8 FL (ref 80–99.9)
MONOCYTES ABSOLUTE: 0.46 E9/L (ref 0.1–0.95)
MONOCYTES RELATIVE PERCENT: 7.9 % (ref 2–12)
NEUTROPHILS ABSOLUTE: 3.55 E9/L (ref 1.8–7.3)
NEUTROPHILS RELATIVE PERCENT: 61.1 % (ref 43–80)
OVALOCYTES: ABNORMAL
PDW BLD-RTO: 18 FL (ref 11.5–15)
PLATELET # BLD: 445 E9/L (ref 130–450)
PMV BLD AUTO: 9.8 FL (ref 7–12)
POIKILOCYTES: ABNORMAL
POLYCHROMASIA: ABNORMAL
POTASSIUM SERPL-SCNC: 4.6 MMOL/L (ref 3.5–5)
RBC # BLD: 4.21 E12/L (ref 3.5–5.5)
SEDIMENTATION RATE, ERYTHROCYTE: 50 MM/HR (ref 0–20)
SODIUM BLD-SCNC: 140 MMOL/L (ref 132–146)
TOTAL PROTEIN: 7.7 G/DL (ref 6.4–8.3)
WBC # BLD: 5.8 E9/L (ref 4.5–11.5)

## 2019-12-27 PROCEDURE — 80053 COMPREHEN METABOLIC PANEL: CPT

## 2019-12-27 PROCEDURE — 85025 COMPLETE CBC W/AUTO DIFF WBC: CPT

## 2019-12-27 PROCEDURE — 86140 C-REACTIVE PROTEIN: CPT

## 2019-12-27 PROCEDURE — 36415 COLL VENOUS BLD VENIPUNCTURE: CPT

## 2019-12-27 PROCEDURE — 85651 RBC SED RATE NONAUTOMATED: CPT

## 2019-12-30 ENCOUNTER — HOSPITAL ENCOUNTER (OUTPATIENT)
Age: 34
Discharge: HOME | End: 2020-01-01
Payer: COMMERCIAL

## 2019-12-30 LAB
ALBUMIN SERPL-MCNC: 3.9 G/DL (ref 3.5–5.2)
ALP BLD-CCNC: 119 U/L (ref 35–104)
ALT SERPL-CCNC: 15 U/L (ref 0–32)
ANION GAP SERPL CALCULATED.3IONS-SCNC: 17 MMOL/L (ref 7–16)
AST SERPL-CCNC: 10 U/L (ref 0–31)
BASOPHILS ABSOLUTE: 0.03 E9/L (ref 0–0.2)
BASOPHILS RELATIVE PERCENT: 0.5 % (ref 0–2)
BILIRUB SERPL-MCNC: <0.2 MG/DL (ref 0–1.2)
BUN BLDV-MCNC: 17 MG/DL (ref 6–20)
C-REACTIVE PROTEIN: 0.4 MG/DL (ref 0–0.4)
CALCIUM SERPL-MCNC: 8.8 MG/DL (ref 8.6–10.2)
CHLORIDE BLD-SCNC: 109 MMOL/L (ref 98–107)
CO2: 17 MMOL/L (ref 22–29)
CREAT SERPL-MCNC: 0.6 MG/DL (ref 0.5–1)
EOSINOPHILS ABSOLUTE: 0.1 E9/L (ref 0.05–0.5)
EOSINOPHILS RELATIVE PERCENT: 1.8 % (ref 0–6)
GFR AFRICAN AMERICAN: >60
GFR NON-AFRICAN AMERICAN: >60 ML/MIN/1.73
GLUCOSE BLD-MCNC: 88 MG/DL (ref 74–99)
HCT VFR BLD CALC: 35.4 % (ref 34–48)
HEMOGLOBIN: 10.4 G/DL (ref 11.5–15.5)
IMMATURE GRANULOCYTES #: 0.02 E9/L
IMMATURE GRANULOCYTES %: 0.4 % (ref 0–5)
LYMPHOCYTES ABSOLUTE: 1.68 E9/L (ref 1.5–4)
LYMPHOCYTES RELATIVE PERCENT: 30.1 % (ref 20–42)
MCH RBC QN AUTO: 24.9 PG (ref 26–35)
MCHC RBC AUTO-ENTMCNC: 29.4 % (ref 32–34.5)
MCV RBC AUTO: 84.9 FL (ref 80–99.9)
MONOCYTES ABSOLUTE: 0.46 E9/L (ref 0.1–0.95)
MONOCYTES RELATIVE PERCENT: 8.2 % (ref 2–12)
NEUTROPHILS ABSOLUTE: 3.29 E9/L (ref 1.8–7.3)
NEUTROPHILS RELATIVE PERCENT: 59 % (ref 43–80)
PDW BLD-RTO: 18.1 FL (ref 11.5–15)
PLATELET # BLD: 349 E9/L (ref 130–450)
PMV BLD AUTO: 10.4 FL (ref 7–12)
POTASSIUM SERPL-SCNC: 4.1 MMOL/L (ref 3.5–5)
RBC # BLD: 4.17 E12/L (ref 3.5–5.5)
SEDIMENTATION RATE, ERYTHROCYTE: 28 MM/HR (ref 0–20)
SODIUM BLD-SCNC: 143 MMOL/L (ref 132–146)
TOTAL PROTEIN: 7.4 G/DL (ref 6.4–8.3)
WBC # BLD: 5.6 E9/L (ref 4.5–11.5)

## 2019-12-30 PROCEDURE — 85651 RBC SED RATE NONAUTOMATED: CPT

## 2019-12-30 PROCEDURE — 86140 C-REACTIVE PROTEIN: CPT

## 2019-12-30 PROCEDURE — 85025 COMPLETE CBC W/AUTO DIFF WBC: CPT

## 2019-12-30 PROCEDURE — 36415 COLL VENOUS BLD VENIPUNCTURE: CPT

## 2019-12-30 PROCEDURE — 80053 COMPREHEN METABOLIC PANEL: CPT

## 2020-01-03 ENCOUNTER — HOSPITAL ENCOUNTER (OUTPATIENT)
Age: 35
Discharge: HOME | End: 2020-01-05
Payer: COMMERCIAL

## 2020-01-03 LAB
ALBUMIN SERPL-MCNC: 3.9 G/DL (ref 3.5–5.2)
ALP BLD-CCNC: 127 U/L (ref 35–104)
ALT SERPL-CCNC: 14 U/L (ref 0–32)
ANION GAP SERPL CALCULATED.3IONS-SCNC: 16 MMOL/L (ref 7–16)
AST SERPL-CCNC: 12 U/L (ref 0–31)
BASOPHILS ABSOLUTE: 0.02 E9/L (ref 0–0.2)
BASOPHILS RELATIVE PERCENT: 0.3 % (ref 0–2)
BILIRUB SERPL-MCNC: <0.2 MG/DL (ref 0–1.2)
BUN BLDV-MCNC: 13 MG/DL (ref 6–20)
CALCIUM SERPL-MCNC: 8.8 MG/DL (ref 8.6–10.2)
CHLORIDE BLD-SCNC: 106 MMOL/L (ref 98–107)
CO2: 18 MMOL/L (ref 22–29)
CREAT SERPL-MCNC: 0.5 MG/DL (ref 0.5–1)
EOSINOPHILS ABSOLUTE: 0.08 E9/L (ref 0.05–0.5)
EOSINOPHILS RELATIVE PERCENT: 1.3 % (ref 0–6)
GFR AFRICAN AMERICAN: >60
GFR NON-AFRICAN AMERICAN: >60 ML/MIN/1.73
GLUCOSE BLD-MCNC: 111 MG/DL (ref 74–99)
HCT VFR BLD CALC: 35.2 % (ref 34–48)
HEMOGLOBIN: 10.3 G/DL (ref 11.5–15.5)
IMMATURE GRANULOCYTES #: 0.02 E9/L
IMMATURE GRANULOCYTES %: 0.3 % (ref 0–5)
LYMPHOCYTES ABSOLUTE: 1.57 E9/L (ref 1.5–4)
LYMPHOCYTES RELATIVE PERCENT: 26.4 % (ref 20–42)
MCH RBC QN AUTO: 24.5 PG (ref 26–35)
MCHC RBC AUTO-ENTMCNC: 29.3 % (ref 32–34.5)
MCV RBC AUTO: 83.8 FL (ref 80–99.9)
MONOCYTES ABSOLUTE: 0.32 E9/L (ref 0.1–0.95)
MONOCYTES RELATIVE PERCENT: 5.4 % (ref 2–12)
NEUTROPHILS ABSOLUTE: 3.93 E9/L (ref 1.8–7.3)
NEUTROPHILS RELATIVE PERCENT: 66.3 % (ref 43–80)
PDW BLD-RTO: 18.3 FL (ref 11.5–15)
PLATELET # BLD: 259 E9/L (ref 130–450)
PMV BLD AUTO: 10.8 FL (ref 7–12)
POTASSIUM SERPL-SCNC: 3.9 MMOL/L (ref 3.5–5)
RBC # BLD: 4.2 E12/L (ref 3.5–5.5)
SODIUM BLD-SCNC: 140 MMOL/L (ref 132–146)
TOTAL PROTEIN: 7.3 G/DL (ref 6.4–8.3)
WBC # BLD: 5.9 E9/L (ref 4.5–11.5)

## 2020-01-03 PROCEDURE — 36415 COLL VENOUS BLD VENIPUNCTURE: CPT

## 2020-01-03 PROCEDURE — 85025 COMPLETE CBC W/AUTO DIFF WBC: CPT

## 2020-01-03 PROCEDURE — 80053 COMPREHEN METABOLIC PANEL: CPT

## 2020-01-06 ENCOUNTER — HOSPITAL ENCOUNTER (OUTPATIENT)
Age: 35
Discharge: HOME | End: 2020-01-08
Payer: COMMERCIAL

## 2020-01-06 LAB
ALBUMIN SERPL-MCNC: 3.8 G/DL (ref 3.5–5.2)
ALP BLD-CCNC: 135 U/L (ref 35–104)
ALT SERPL-CCNC: 11 U/L (ref 0–32)
ANION GAP SERPL CALCULATED.3IONS-SCNC: 14 MMOL/L (ref 7–16)
AST SERPL-CCNC: 13 U/L (ref 0–31)
BASOPHILS ABSOLUTE: 0.03 E9/L (ref 0–0.2)
BASOPHILS RELATIVE PERCENT: 0.7 % (ref 0–2)
BILIRUB SERPL-MCNC: 0.4 MG/DL (ref 0–1.2)
BUN BLDV-MCNC: 14 MG/DL (ref 6–20)
C-REACTIVE PROTEIN: 0.2 MG/DL (ref 0–0.4)
CALCIUM SERPL-MCNC: 9 MG/DL (ref 8.6–10.2)
CHLORIDE BLD-SCNC: 106 MMOL/L (ref 98–107)
CO2: 21 MMOL/L (ref 22–29)
CREAT SERPL-MCNC: 0.5 MG/DL (ref 0.5–1)
EOSINOPHILS ABSOLUTE: 0.09 E9/L (ref 0.05–0.5)
EOSINOPHILS RELATIVE PERCENT: 2.1 % (ref 0–6)
GFR AFRICAN AMERICAN: >60
GFR NON-AFRICAN AMERICAN: >60 ML/MIN/1.73
GLUCOSE BLD-MCNC: 77 MG/DL (ref 74–99)
HCT VFR BLD CALC: 34.6 % (ref 34–48)
HEMOGLOBIN: 10.2 G/DL (ref 11.5–15.5)
IMMATURE GRANULOCYTES #: 0.01 E9/L
IMMATURE GRANULOCYTES %: 0.2 % (ref 0–5)
LYMPHOCYTES ABSOLUTE: 1.76 E9/L (ref 1.5–4)
LYMPHOCYTES RELATIVE PERCENT: 41.2 % (ref 20–42)
MCH RBC QN AUTO: 24.8 PG (ref 26–35)
MCHC RBC AUTO-ENTMCNC: 29.5 % (ref 32–34.5)
MCV RBC AUTO: 84 FL (ref 80–99.9)
MONOCYTES ABSOLUTE: 0.35 E9/L (ref 0.1–0.95)
MONOCYTES RELATIVE PERCENT: 8.2 % (ref 2–12)
NEUTROPHILS ABSOLUTE: 2.03 E9/L (ref 1.8–7.3)
NEUTROPHILS RELATIVE PERCENT: 47.6 % (ref 43–80)
PDW BLD-RTO: 18.2 FL (ref 11.5–15)
PLATELET # BLD: 217 E9/L (ref 130–450)
PMV BLD AUTO: 11.5 FL (ref 7–12)
POTASSIUM SERPL-SCNC: 4.3 MMOL/L (ref 3.5–5)
RBC # BLD: 4.12 E12/L (ref 3.5–5.5)
SEDIMENTATION RATE, ERYTHROCYTE: 15 MM/HR (ref 0–20)
SODIUM BLD-SCNC: 141 MMOL/L (ref 132–146)
TOTAL PROTEIN: 6.8 G/DL (ref 6.4–8.3)
WBC # BLD: 4.3 E9/L (ref 4.5–11.5)

## 2020-01-06 PROCEDURE — 86140 C-REACTIVE PROTEIN: CPT

## 2020-01-06 PROCEDURE — 85651 RBC SED RATE NONAUTOMATED: CPT

## 2020-01-06 PROCEDURE — 80053 COMPREHEN METABOLIC PANEL: CPT

## 2020-01-06 PROCEDURE — 85025 COMPLETE CBC W/AUTO DIFF WBC: CPT

## 2020-01-06 PROCEDURE — 36415 COLL VENOUS BLD VENIPUNCTURE: CPT

## 2020-01-10 ENCOUNTER — HOSPITAL ENCOUNTER (OUTPATIENT)
Age: 35
Discharge: HOME | End: 2020-01-12
Payer: COMMERCIAL

## 2020-01-10 LAB
ALBUMIN SERPL-MCNC: 4.2 G/DL (ref 3.5–5.2)
ALP BLD-CCNC: 149 U/L (ref 35–104)
ALT SERPL-CCNC: 17 U/L (ref 0–32)
ANION GAP SERPL CALCULATED.3IONS-SCNC: 16 MMOL/L (ref 7–16)
AST SERPL-CCNC: 16 U/L (ref 0–31)
BASOPHILS ABSOLUTE: 0.02 E9/L (ref 0–0.2)
BASOPHILS RELATIVE PERCENT: 0.4 % (ref 0–2)
BILIRUB SERPL-MCNC: 0.3 MG/DL (ref 0–1.2)
BUN BLDV-MCNC: 16 MG/DL (ref 6–20)
CALCIUM SERPL-MCNC: 9.2 MG/DL (ref 8.6–10.2)
CHLORIDE BLD-SCNC: 104 MMOL/L (ref 98–107)
CO2: 19 MMOL/L (ref 22–29)
CREAT SERPL-MCNC: 0.5 MG/DL (ref 0.5–1)
EOSINOPHILS ABSOLUTE: 0.12 E9/L (ref 0.05–0.5)
EOSINOPHILS RELATIVE PERCENT: 2.6 % (ref 0–6)
GFR AFRICAN AMERICAN: >60
GFR NON-AFRICAN AMERICAN: >60 ML/MIN/1.73
GLUCOSE BLD-MCNC: 98 MG/DL (ref 74–99)
HCT VFR BLD CALC: 37.8 % (ref 34–48)
HEMOGLOBIN: 11.2 G/DL (ref 11.5–15.5)
IMMATURE GRANULOCYTES #: 0.02 E9/L
IMMATURE GRANULOCYTES %: 0.4 % (ref 0–5)
LYMPHOCYTES ABSOLUTE: 1.18 E9/L (ref 1.5–4)
LYMPHOCYTES RELATIVE PERCENT: 25.6 % (ref 20–42)
MCH RBC QN AUTO: 24.9 PG (ref 26–35)
MCHC RBC AUTO-ENTMCNC: 29.6 % (ref 32–34.5)
MCV RBC AUTO: 84 FL (ref 80–99.9)
MONOCYTES ABSOLUTE: 0.57 E9/L (ref 0.1–0.95)
MONOCYTES RELATIVE PERCENT: 12.4 % (ref 2–12)
NEUTROPHILS ABSOLUTE: 2.7 E9/L (ref 1.8–7.3)
NEUTROPHILS RELATIVE PERCENT: 58.6 % (ref 43–80)
PDW BLD-RTO: 18.8 FL (ref 11.5–15)
PLATELET # BLD: 195 E9/L (ref 130–450)
PMV BLD AUTO: 10.9 FL (ref 7–12)
POTASSIUM SERPL-SCNC: 4.1 MMOL/L (ref 3.5–5)
RBC # BLD: 4.5 E12/L (ref 3.5–5.5)
SODIUM BLD-SCNC: 139 MMOL/L (ref 132–146)
TOTAL PROTEIN: 7.9 G/DL (ref 6.4–8.3)
WBC # BLD: 4.6 E9/L (ref 4.5–11.5)

## 2020-01-10 PROCEDURE — 36415 COLL VENOUS BLD VENIPUNCTURE: CPT

## 2020-01-10 PROCEDURE — 85025 COMPLETE CBC W/AUTO DIFF WBC: CPT

## 2020-01-10 PROCEDURE — 80053 COMPREHEN METABOLIC PANEL: CPT

## 2020-01-13 ENCOUNTER — HOSPITAL ENCOUNTER (OUTPATIENT)
Age: 35
Discharge: HOME | End: 2020-01-15
Payer: COMMERCIAL

## 2020-01-13 LAB
ALBUMIN SERPL-MCNC: 4.1 G/DL (ref 3.5–5.2)
ALP BLD-CCNC: 155 U/L (ref 35–104)
ALT SERPL-CCNC: 14 U/L (ref 0–32)
ANION GAP SERPL CALCULATED.3IONS-SCNC: 14 MMOL/L (ref 7–16)
AST SERPL-CCNC: 13 U/L (ref 0–31)
BASOPHILS ABSOLUTE: 0.02 E9/L (ref 0–0.2)
BASOPHILS RELATIVE PERCENT: 0.5 % (ref 0–2)
BILIRUB SERPL-MCNC: 0.3 MG/DL (ref 0–1.2)
BUN BLDV-MCNC: 15 MG/DL (ref 6–20)
C-REACTIVE PROTEIN: 0.6 MG/DL (ref 0–0.4)
CALCIUM SERPL-MCNC: 9 MG/DL (ref 8.6–10.2)
CHLORIDE BLD-SCNC: 105 MMOL/L (ref 98–107)
CO2: 21 MMOL/L (ref 22–29)
CREAT SERPL-MCNC: 0.5 MG/DL (ref 0.5–1)
EOSINOPHILS ABSOLUTE: 0.09 E9/L (ref 0.05–0.5)
EOSINOPHILS RELATIVE PERCENT: 2.1 % (ref 0–6)
GFR AFRICAN AMERICAN: >60
GFR NON-AFRICAN AMERICAN: >60 ML/MIN/1.73
GLUCOSE BLD-MCNC: 89 MG/DL (ref 74–99)
HCT VFR BLD CALC: 37.3 % (ref 34–48)
HEMOGLOBIN: 11.2 G/DL (ref 11.5–15.5)
IMMATURE GRANULOCYTES #: 0.01 E9/L
IMMATURE GRANULOCYTES %: 0.2 % (ref 0–5)
LYMPHOCYTES ABSOLUTE: 1.59 E9/L (ref 1.5–4)
LYMPHOCYTES RELATIVE PERCENT: 36.9 % (ref 20–42)
MCH RBC QN AUTO: 25.2 PG (ref 26–35)
MCHC RBC AUTO-ENTMCNC: 30 % (ref 32–34.5)
MCV RBC AUTO: 84 FL (ref 80–99.9)
MONOCYTES ABSOLUTE: 0.41 E9/L (ref 0.1–0.95)
MONOCYTES RELATIVE PERCENT: 9.5 % (ref 2–12)
NEUTROPHILS ABSOLUTE: 2.19 E9/L (ref 1.8–7.3)
NEUTROPHILS RELATIVE PERCENT: 50.8 % (ref 43–80)
PDW BLD-RTO: 18.7 FL (ref 11.5–15)
PLATELET # BLD: 165 E9/L (ref 130–450)
PMV BLD AUTO: 11.2 FL (ref 7–12)
POTASSIUM SERPL-SCNC: 4 MMOL/L (ref 3.5–5)
RBC # BLD: 4.44 E12/L (ref 3.5–5.5)
SEDIMENTATION RATE, ERYTHROCYTE: 26 MM/HR (ref 0–20)
SODIUM BLD-SCNC: 140 MMOL/L (ref 132–146)
TOTAL PROTEIN: 7.6 G/DL (ref 6.4–8.3)
WBC # BLD: 4.3 E9/L (ref 4.5–11.5)

## 2020-01-13 PROCEDURE — 80053 COMPREHEN METABOLIC PANEL: CPT

## 2020-01-13 PROCEDURE — 85025 COMPLETE CBC W/AUTO DIFF WBC: CPT

## 2020-01-13 PROCEDURE — 85651 RBC SED RATE NONAUTOMATED: CPT

## 2020-01-13 PROCEDURE — 86140 C-REACTIVE PROTEIN: CPT

## 2020-01-13 PROCEDURE — 36415 COLL VENOUS BLD VENIPUNCTURE: CPT

## 2020-01-30 NOTE — DISCHARGE SUMMARY
abscess. HOSPITAL COURSE:  The patient was admitted. Infectious Disease consult  was obtained, who started IV antibiotics. Surgical consult was  obtained. After the surgical consultation, it was noted that patient  had formed a subcutaneous abscess. At this point at bedside, incision  and drainage was done; however, following this, it was felt that further  drainage needs to be done, so therefore next day, the patient was _____  for the operating room, and had further incision and drainage done in  the operating room. The patient stayed another 24 hours postop for IV  antibiotics and was to be discharged to home. DIAGNOSIS AT THE TIME OF DISCHARGE:  Status post subcutaneous abdominal  abscess, resolving. DISPOSITION AT THE TIME OF DISCHARGE:  Good. MEDICATIONS AT THE TIME OF DISCHARGE:  Antibiotics as per Infectious  Disease and also wound VAC as per General Surgery. Percocet one every 4  hours as needed for pain. Follow up with home health care. ACTIVITY:  No heavy lifting or straining for four to six weeks. FOLLOWUP:  The patient will receive followup care in my office in one  week.         Joy De La Cruz MD    D: 01/29/2020 14:54:54       T: 01/30/2020 9:11:37     [email or web address]  Job#: [id number]     Doc#: [id number]    CC:

## 2021-05-05 ENCOUNTER — HOSPITAL ENCOUNTER (EMERGENCY)
Age: 36
Discharge: HOME | End: 2021-05-05
Attending: EMERGENCY MEDICINE
Payer: COMMERCIAL

## 2021-05-05 VITALS
DIASTOLIC BLOOD PRESSURE: 78 MMHG | WEIGHT: 150 LBS | SYSTOLIC BLOOD PRESSURE: 114 MMHG | HEART RATE: 78 BPM | HEIGHT: 63 IN | OXYGEN SATURATION: 98 % | BODY MASS INDEX: 26.58 KG/M2 | TEMPERATURE: 97 F | RESPIRATION RATE: 18 BRPM

## 2021-05-05 DIAGNOSIS — M54.50 ACUTE RIGHT-SIDED LOW BACK PAIN WITHOUT SCIATICA: Primary | ICD-10-CM

## 2021-05-05 PROCEDURE — 96372 THER/PROPH/DIAG INJ SC/IM: CPT

## 2021-05-05 PROCEDURE — 99283 EMERGENCY DEPT VISIT LOW MDM: CPT

## 2021-05-05 PROCEDURE — 6360000002 HC RX W HCPCS: Performed by: EMERGENCY MEDICINE

## 2021-05-05 RX ORDER — NAPROXEN 500 MG/1
500 TABLET ORAL 2 TIMES DAILY PRN
Qty: 20 TABLET | Refills: 0 | Status: SHIPPED | OUTPATIENT
Start: 2021-05-05 | End: 2021-05-05 | Stop reason: SDUPTHER

## 2021-05-05 RX ORDER — DEXAMETHASONE SODIUM PHOSPHATE 10 MG/ML
10 INJECTION INTRAMUSCULAR; INTRAVENOUS ONCE
Status: COMPLETED | OUTPATIENT
Start: 2021-05-05 | End: 2021-05-05

## 2021-05-05 RX ORDER — CYCLOBENZAPRINE HCL 10 MG
10 TABLET ORAL 3 TIMES DAILY PRN
Qty: 20 TABLET | Refills: 0 | Status: SHIPPED | OUTPATIENT
Start: 2021-05-05 | End: 2021-05-12

## 2021-05-05 RX ORDER — CYCLOBENZAPRINE HCL 10 MG
10 TABLET ORAL 3 TIMES DAILY PRN
Qty: 20 TABLET | Refills: 0 | Status: SHIPPED | OUTPATIENT
Start: 2021-05-05 | End: 2021-05-05 | Stop reason: SDUPTHER

## 2021-05-05 RX ORDER — KETOROLAC TROMETHAMINE 30 MG/ML
60 INJECTION, SOLUTION INTRAMUSCULAR; INTRAVENOUS ONCE
Status: COMPLETED | OUTPATIENT
Start: 2021-05-05 | End: 2021-05-05

## 2021-05-05 RX ORDER — LIDOCAINE 50 MG/G
1 PATCH TOPICAL EVERY 24 HOURS
Qty: 30 PATCH | Refills: 0 | Status: SHIPPED | OUTPATIENT
Start: 2021-05-05 | End: 2021-06-04

## 2021-05-05 RX ORDER — LIDOCAINE 50 MG/G
1 PATCH TOPICAL EVERY 24 HOURS
Qty: 30 PATCH | Refills: 0 | Status: SHIPPED | OUTPATIENT
Start: 2021-05-05 | End: 2021-05-05 | Stop reason: SDUPTHER

## 2021-05-05 RX ORDER — ORPHENADRINE CITRATE 30 MG/ML
60 INJECTION INTRAMUSCULAR; INTRAVENOUS ONCE
Status: COMPLETED | OUTPATIENT
Start: 2021-05-05 | End: 2021-05-05

## 2021-05-05 RX ORDER — NAPROXEN 500 MG/1
500 TABLET ORAL 2 TIMES DAILY PRN
Qty: 20 TABLET | Refills: 0 | Status: SHIPPED | OUTPATIENT
Start: 2021-05-05 | End: 2021-05-15

## 2021-05-05 RX ADMIN — ORPHENADRINE CITRATE 60 MG: 30 INJECTION INTRAMUSCULAR; INTRAVENOUS at 09:46

## 2021-05-05 RX ADMIN — DEXAMETHASONE SODIUM PHOSPHATE 10 MG: 10 INJECTION INTRAMUSCULAR; INTRAVENOUS at 09:46

## 2021-05-05 RX ADMIN — KETOROLAC TROMETHAMINE 60 MG: 30 INJECTION, SOLUTION INTRAMUSCULAR at 09:45

## 2021-05-05 ASSESSMENT — PAIN SCALES - GENERAL: PAINLEVEL_OUTOF10: 10

## 2021-05-05 ASSESSMENT — PAIN DESCRIPTION - ORIENTATION: ORIENTATION: RIGHT

## 2021-05-05 NOTE — ED PROVIDER NOTES
HPI:  21,   Time: 8:47 AM EDT       Tara Dooley is a 39 y.o. female presenting to the ED for right back pain, beginning 1 day ago. The complaint has been persistent, moderate in severity, and worsened by movement of back. Lifting, felt pop, pain since, hurts to move, better with rest, no hx same, no incontinence/saddle anesthesia/fever/chills/hematuria/urinary sx    Review of Systems:   Pertinent positives and negatives are stated within HPI, all other systems reviewed and are negative.          --------------------------------------------- PAST HISTORY ---------------------------------------------  Past Medical History:  has a past medical history of Gestational diabetes mellitus (GDM) affecting pregnancy. Past Surgical History:  has a past surgical history that includes [place] tooth extraction;  section (N/A, 2019); Insert Midline Catheter (2019); and Abdomen surgery (Right, 2019). Social History:  reports that she quit smoking about 2 years ago. Her smoking use included cigarettes. She started smoking about 18 years ago. She smoked 0.50 packs per day. She has never used smokeless tobacco. She reports previous drug use. Frequency: 21.00 times per week. Drug: Marijuana. She reports that she does not drink alcohol. Family History: family history is not on file. She was adopted. The patients home medications have been reviewed.     Allergies: Pcn [penicillins] and Vancomycin        ---------------------------------------------------PHYSICAL EXAM--------------------------------------    Constitutional/General: Alert and oriented x3, well appearing, non toxic in NAD  Head: Normocephalic and atraumatic  Eyes: PERRL, EOMI, conjunctive normal, sclera non icteric  Mouth: Oropharynx clear, handling secretions,   Neck: Supple, full ROM,   Respiratory:  Not in respiratory distress  Cardiovascular:   2+ distal pulses  Chest: No chest wall tenderness  GI:  Abdomen Soft, Non tender, Non distended. R  Musculoskeletal: Moves all extremities x 4. Warm and well perfused, no clubbing, cyanosis, or edema. Capillary refill <3 seconds, right lumbar ttp with paraspinal spams  Integument: skin warm and dry. No rashes. Lymphatic: no lymphadenopathy noted  Neurologic: GCS 15, no focal deficits, nml motor and sensory exam  Psychiatric: Normal Affect    -------------------------------------------------- RESULTS -------------------------------------------------  I have personally reviewed all laboratory and imaging results for this patient. Results are listed below. LABS:  No results found for this visit on 05/05/21. RADIOLOGY:  Interpreted by Radiologist.  No orders to display       EKG: This EKG is signed and interpreted by the EP. Time:   Rate:   Rhythm:   Interpretation:   Comparison:       ------------------------- NURSING NOTES AND VITALS REVIEWED ---------------------------   The nursing notes within the ED encounter and vital signs as below have been reviewed by myself. /78   Pulse 78   Temp 97 °F (36.1 °C) (Tympanic)   Resp 18   Ht 5' 3\" (1.6 m)   Wt 150 lb (68 kg)   LMP 04/21/2021   SpO2 98%   BMI 26.57 kg/m²   Oxygen Saturation Interpretation: Normal    The patients available past medical records and past encounters were reviewed.         ------------------------------ ED COURSE/MEDICAL DECISION MAKING----------------------  Medications   ketorolac (TORADOL) injection 60 mg (60 mg Intramuscular Given 5/5/21 2605)   orphenadrine (NORFLEX) injection 60 mg (60 mg Intramuscular Given 5/5/21 0946)   dexamethasone (DECADRON) injection 10 mg (10 mg Intramuscular Given 5/5/21 0946)         ED COURSE:       Medical Decision Making:    No incont/saddle anesthesia/fever, better with tx, msk by hx/exam, dc outp tfu      This patient's ED course included: a personal history and physicial examination    This patient has remained hemodynamically stable during their ED

## 2021-12-21 ENCOUNTER — APPOINTMENT (OUTPATIENT)
Dept: GENERAL RADIOLOGY | Age: 36
End: 2021-12-21
Payer: COMMERCIAL

## 2021-12-21 ENCOUNTER — HOSPITAL ENCOUNTER (EMERGENCY)
Age: 36
Discharge: HOME | End: 2021-12-21
Attending: STUDENT IN AN ORGANIZED HEALTH CARE EDUCATION/TRAINING PROGRAM
Payer: COMMERCIAL

## 2021-12-21 ENCOUNTER — APPOINTMENT (OUTPATIENT)
Dept: CT IMAGING | Age: 36
End: 2021-12-21
Payer: COMMERCIAL

## 2021-12-21 VITALS
TEMPERATURE: 97.6 F | HEART RATE: 81 BPM | SYSTOLIC BLOOD PRESSURE: 133 MMHG | RESPIRATION RATE: 16 BRPM | DIASTOLIC BLOOD PRESSURE: 79 MMHG | OXYGEN SATURATION: 98 %

## 2021-12-21 DIAGNOSIS — B34.9 VIRAL ILLNESS: Primary | ICD-10-CM

## 2021-12-21 DIAGNOSIS — R05.9 COUGH: ICD-10-CM

## 2021-12-21 LAB
HCG, URINE, POC: NEGATIVE
INFLUENZA A: NOT DETECTED
INFLUENZA B: NOT DETECTED
Lab: NORMAL
NEGATIVE QC PASS/FAIL: NORMAL
POSITIVE QC PASS/FAIL: NORMAL
SARS-COV-2 RNA, RT PCR: NOT DETECTED

## 2021-12-21 PROCEDURE — 71250 CT THORAX DX C-: CPT

## 2021-12-21 PROCEDURE — 99282 EMERGENCY DEPT VISIT SF MDM: CPT

## 2021-12-21 PROCEDURE — 71046 X-RAY EXAM CHEST 2 VIEWS: CPT

## 2021-12-21 PROCEDURE — 87636 SARSCOV2 & INF A&B AMP PRB: CPT

## 2021-12-21 RX ORDER — FLUTICASONE PROPIONATE 50 MCG
1 SPRAY, SUSPENSION (ML) NASAL DAILY
Qty: 16 G | Refills: 0 | Status: SHIPPED | OUTPATIENT
Start: 2021-12-21

## 2021-12-21 RX ORDER — BENZONATATE 100 MG/1
100 CAPSULE ORAL 3 TIMES DAILY PRN
Qty: 12 CAPSULE | Refills: 0 | Status: SHIPPED | OUTPATIENT
Start: 2021-12-21 | End: 2021-12-25

## 2021-12-21 RX ORDER — ALBUTEROL SULFATE 90 UG/1
2 AEROSOL, METERED RESPIRATORY (INHALATION) 4 TIMES DAILY PRN
Qty: 18 G | Refills: 0 | Status: SHIPPED | OUTPATIENT
Start: 2021-12-21

## 2021-12-21 NOTE — ED PROVIDER NOTES
ED Attending shared visit  CC: Isabella Silverman [id number]  Department of Emergency Medicine   ED  Encounter Note  Admit Date/RoomTime: 2021 12:06 PM  ED Room: [address]    NAME: Usama Delacruz  : 1985  MRN: [id number]     Chief Complaint:  Concern For COVID-19 (Cough x 1 week, loss of smell and taste starting yesterday. ) and Nausea    History of Present Illness       Opal Hernandez is a 39 y.o. old female who presents to the emergency department by private vehicle, for chills, nasal congestion and cough, which began 1 day(s) prior to arrival.  Since onset the symptoms have been remaining constant and moderate in severity. The symptoms are associated with dry cough, nausea, muscle aches, nasal congestion and runny nose. There has been no abdominal pain, decreased appetite, chest tightness, conjunctivitis, watery eyes, productive cough, vomiting, diarrhea, dizziness, dysuria, urinary frequency, earache, ear pulling, fever, fatigue, headache, hoarseness, irritability, joint swelling, neck stiffness, rash, sneezing, sore throat, scratchy throat, swollen glands, wheezing, loss of taste,or shortness of breah or chest pain. The patient has been fully vaccinated against COVID-19    ROS   Pertinent positives and negatives are stated within HPI, all other systems reviewed and are negative. Past Medical History:  has a past medical history of Gestational diabetes mellitus (GDM) affecting pregnancy. Surgical History:  has a past surgical history that includes [place] tooth extraction;  section (N/A, 2019); Insert Midline Catheter (2019); and Abdomen surgery (Right, 2019). Social History:  reports that she quit smoking about 3 years ago. Her smoking use included cigarettes. She started smoking about 19 years ago. She smoked 0.50 packs per day. She has never used smokeless tobacco. She reports previous drug use. Frequency: 21.00 times per week. Drug: Marijuana [place]). She reports that she does not drink alcohol. Family History: family history is not on file. She was adopted. Allergies: Pcn [penicillins] and Vancomycin    Physical Exam   Oxygen Saturation Interpretation: Normal on room air analysis. ED Triage Vitals   BP Temp Temp src Pulse Resp SpO2 Height Weight   12/21/21 1211 12/21/21 1211 -- 12/21/21 1114 12/21/21 1211 12/21/21 1114 -- --   133/79 97.6 °F (36.4 °C)  88 18 97 %           · Constitutional:  Alert, development consistent with age. · Ears:  External Ears: Bilateral normal.               TM's & External Canals: normal TM's and external ear canals bilaterally. · Nose:   There is clear rhinorrhea. · Sinuses: mild Bilateral maxillary sinus tenderness. mild Bilateral frontal sinus tenderness. · Mouth:  normal tongue and buccal mucosa. · Throat: no erythema or exudates noted. Teeth and gums normal..  Airway Patent. · Neck:  Supple. No meningeal signs. There is no  preauricular, submental, parotid, anterior cervical and posterior cervical node tenderness. · Respiratory:   Breath sounds: Bilateral normal.  Lung sounds: normal.   · CV:  Regular rate and rhythm, normal heart sounds, without pathological murmurs, ectopy, gallops, or rubs. · GI:  Abdomen Soft, nontender, good bowel sounds. No firm or pulsatile mass. · Integument:  Normal turgor. Warm, dry, without visible rash. · Neurological:  Oriented. Motor functions intact.        Lab / Imaging Results   (All laboratory and radiology results have been personally reviewed by myself)  Labs:  Results for orders placed or performed during the hospital encounter of 12/21/21   COVID-19 & Influenza Combo    Specimen: Nasopharyngeal Swab   Result Value Ref Range    SARS-CoV-2 RNA, RT PCR NOT DETECTED NOT DETECTED    INFLUENZA A NOT DETECTED NOT DETECTED    INFLUENZA B NOT DETECTED NOT DETECTED   POC Pregnancy Urine   Result Value Ref Range    HCG, Urine, POC Negative Negative    Lot Patient continues to be non-toxic on re-evaluation. Findings were discussed with the patient and reasons to immediately return to the ED were articulated to them. They will follow-up with their PMD.      Assessment     1. Viral illness    2. Cough      Plan   Discharged home. Patient condition is stable    New Medications     Discharge Medication List as of 12/21/2021  3:01 PM      START taking these medications    Details   fluticasone (FLONASE) 50 MCG/ACT nasal spray 1 spray by Each Nostril route daily, Disp-16 g, R-0Print      albuterol sulfate HFA (VENTOLIN HFA) 108 (90 Base) MCG/ACT inhaler Inhale 2 puffs into the lungs 4 times daily as needed for Wheezing, Disp-18 g, R-0Print      benzonatate (TESSALON) 100 MG capsule Take 1 capsule by mouth 3 times daily as needed for Cough, Disp-12 capsule, R-0Print           Electronically signed by RACHEL Stringer CNP   DD: 12/21/21  **This report was transcribed using voice recognition software. Every effort was made to ensure accuracy; however, inadvertent computerized transcription errors may be present.   END OF ED PROVIDER NOTE          RACHEL Stringer CNP  12/21/21 [id number]

## 2021-12-21 NOTE — Clinical Note
Wade Brown was seen and treated in our emergency department on 12/21/2021. She may return to work on 12/23/2021. If you have any questions or concerns, please don't hesitate to call.       Missael Moctezuma, APRN - CNP

## 2021-12-26 ENCOUNTER — APPOINTMENT (OUTPATIENT)
Dept: CT IMAGING | Age: 36
End: 2021-12-26
Payer: COMMERCIAL

## 2021-12-26 ENCOUNTER — APPOINTMENT (OUTPATIENT)
Dept: GENERAL RADIOLOGY | Age: 36
End: 2021-12-26
Payer: COMMERCIAL

## 2021-12-26 ENCOUNTER — HOSPITAL ENCOUNTER (EMERGENCY)
Age: 36
Discharge: HOME | End: 2021-12-27
Attending: STUDENT IN AN ORGANIZED HEALTH CARE EDUCATION/TRAINING PROGRAM
Payer: COMMERCIAL

## 2021-12-26 VITALS
RESPIRATION RATE: 19 BRPM | HEIGHT: 63 IN | HEART RATE: 71 BPM | SYSTOLIC BLOOD PRESSURE: 111 MMHG | TEMPERATURE: 98.2 F | BODY MASS INDEX: 46.07 KG/M2 | DIASTOLIC BLOOD PRESSURE: 68 MMHG | WEIGHT: 260 LBS | OXYGEN SATURATION: 97 %

## 2021-12-26 DIAGNOSIS — R19.7 NAUSEA VOMITING AND DIARRHEA: Primary | ICD-10-CM

## 2021-12-26 DIAGNOSIS — E87.6 HYPOKALEMIA: ICD-10-CM

## 2021-12-26 DIAGNOSIS — B34.9 VIRAL SYNDROME: ICD-10-CM

## 2021-12-26 DIAGNOSIS — R05.9 COUGH: ICD-10-CM

## 2021-12-26 DIAGNOSIS — R11.2 NAUSEA VOMITING AND DIARRHEA: Primary | ICD-10-CM

## 2021-12-26 LAB
ALBUMIN SERPL-MCNC: 4.3 G/DL (ref 3.5–5.2)
ALP BLD-CCNC: 79 U/L (ref 35–104)
ALT SERPL-CCNC: 12 U/L (ref 0–32)
ANION GAP SERPL CALCULATED.3IONS-SCNC: 13 MMOL/L (ref 7–16)
ANION GAP SERPL CALCULATED.3IONS-SCNC: 17 MMOL/L (ref 7–16)
AST SERPL-CCNC: 14 U/L (ref 0–31)
BACTERIA: ABNORMAL /HPF
BASOPHILS ABSOLUTE: 0.02 E9/L (ref 0–0.2)
BASOPHILS RELATIVE PERCENT: 0.3 % (ref 0–2)
BETA-HYDROXYBUTYRATE: 0.45 MMOL/L (ref 0.02–0.27)
BILIRUB SERPL-MCNC: 0.5 MG/DL (ref 0–1.2)
BILIRUBIN URINE: NEGATIVE
BLOOD, URINE: ABNORMAL
BUN BLDV-MCNC: 10 MG/DL (ref 6–20)
BUN BLDV-MCNC: 11 MG/DL (ref 6–20)
CALCIUM SERPL-MCNC: 8.5 MG/DL (ref 8.6–10.2)
CALCIUM SERPL-MCNC: 9 MG/DL (ref 8.6–10.2)
CHLORIDE BLD-SCNC: 96 MMOL/L (ref 98–107)
CHLORIDE BLD-SCNC: 99 MMOL/L (ref 98–107)
CLARITY: CLEAR
CO2: 21 MMOL/L (ref 22–29)
CO2: 23 MMOL/L (ref 22–29)
COLOR: YELLOW
CREAT SERPL-MCNC: 0.6 MG/DL (ref 0.5–1)
CREAT SERPL-MCNC: 0.6 MG/DL (ref 0.5–1)
D DIMER: 512 NG/ML DDU
EOSINOPHILS ABSOLUTE: 0 E9/L (ref 0.05–0.5)
EOSINOPHILS RELATIVE PERCENT: 0 % (ref 0–6)
EPITHELIAL CELLS, UA: ABNORMAL /HPF
GFR AFRICAN AMERICAN: >60
GFR AFRICAN AMERICAN: >60
GFR NON-AFRICAN AMERICAN: >60 ML/MIN/1.73
GFR NON-AFRICAN AMERICAN: >60 ML/MIN/1.73
GLUCOSE BLD-MCNC: 94 MG/DL (ref 74–99)
GLUCOSE BLD-MCNC: 95 MG/DL (ref 74–99)
GLUCOSE URINE: NEGATIVE MG/DL
HCG(URINE) PREGNANCY TEST: NEGATIVE
HCT VFR BLD CALC: 42.4 % (ref 34–48)
HEMOGLOBIN: 14.7 G/DL (ref 11.5–15.5)
IMMATURE GRANULOCYTES #: 0.02 E9/L
IMMATURE GRANULOCYTES %: 0.3 % (ref 0–5)
KETONES, URINE: NEGATIVE MG/DL
LACTIC ACID: 0.7 MMOL/L (ref 0.5–2.2)
LEUKOCYTE ESTERASE, URINE: NEGATIVE
LIPASE: 19 U/L (ref 13–60)
LYMPHOCYTES ABSOLUTE: 1.52 E9/L (ref 1.5–4)
LYMPHOCYTES RELATIVE PERCENT: 19.1 % (ref 20–42)
MAGNESIUM: 2.3 MG/DL (ref 1.6–2.6)
MCH RBC QN AUTO: 30.2 PG (ref 26–35)
MCHC RBC AUTO-ENTMCNC: 34.7 % (ref 32–34.5)
MCV RBC AUTO: 87.1 FL (ref 80–99.9)
MONOCYTES ABSOLUTE: 0.97 E9/L (ref 0.1–0.95)
MONOCYTES RELATIVE PERCENT: 12.2 % (ref 2–12)
NEUTROPHILS ABSOLUTE: 5.43 E9/L (ref 1.8–7.3)
NEUTROPHILS RELATIVE PERCENT: 68.1 % (ref 43–80)
NITRITE, URINE: NEGATIVE
PDW BLD-RTO: 12.8 FL (ref 11.5–15)
PH UA: 6 (ref 5–9)
PLATELET # BLD: 186 E9/L (ref 130–450)
PMV BLD AUTO: 10.3 FL (ref 7–12)
POTASSIUM REFLEX MAGNESIUM: 3 MMOL/L (ref 3.5–5)
POTASSIUM SERPL-SCNC: 3.5 MMOL/L (ref 3.5–5)
PRO-BNP: 9 PG/ML (ref 0–125)
PROTEIN UA: ABNORMAL MG/DL
RBC # BLD: 4.87 E12/L (ref 3.5–5.5)
RBC UA: ABNORMAL /HPF (ref 0–2)
SARS-COV-2, NAAT: NOT DETECTED
SODIUM BLD-SCNC: 133 MMOL/L (ref 132–146)
SODIUM BLD-SCNC: 136 MMOL/L (ref 132–146)
SPECIFIC GRAVITY UA: 1.01 (ref 1–1.03)
TOTAL PROTEIN: 8.1 G/DL (ref 6.4–8.3)
TROPONIN, HIGH SENSITIVITY: <6 NG/L (ref 0–9)
UROBILINOGEN, URINE: 0.2 E.U./DL
WBC # BLD: 8 E9/L (ref 4.5–11.5)
WBC UA: ABNORMAL /HPF (ref 0–5)

## 2021-12-26 PROCEDURE — 96366 THER/PROPH/DIAG IV INF ADDON: CPT

## 2021-12-26 PROCEDURE — 82010 KETONE BODYS QUAN: CPT

## 2021-12-26 PROCEDURE — 85025 COMPLETE CBC W/AUTO DIFF WBC: CPT

## 2021-12-26 PROCEDURE — 6360000004 HC RX CONTRAST MEDICATION: Performed by: RADIOLOGY

## 2021-12-26 PROCEDURE — 81001 URINALYSIS AUTO W/SCOPE: CPT

## 2021-12-26 PROCEDURE — 83605 ASSAY OF LACTIC ACID: CPT

## 2021-12-26 PROCEDURE — 93005 ELECTROCARDIOGRAM TRACING: CPT | Performed by: STUDENT IN AN ORGANIZED HEALTH CARE EDUCATION/TRAINING PROGRAM

## 2021-12-26 PROCEDURE — 96365 THER/PROPH/DIAG IV INF INIT: CPT

## 2021-12-26 PROCEDURE — 80048 BASIC METABOLIC PNL TOTAL CA: CPT

## 2021-12-26 PROCEDURE — 2500000003 HC RX 250 WO HCPCS: Performed by: STUDENT IN AN ORGANIZED HEALTH CARE EDUCATION/TRAINING PROGRAM

## 2021-12-26 PROCEDURE — 84484 ASSAY OF TROPONIN QUANT: CPT

## 2021-12-26 PROCEDURE — 6370000000 HC RX 637 (ALT 250 FOR IP): Performed by: STUDENT IN AN ORGANIZED HEALTH CARE EDUCATION/TRAINING PROGRAM

## 2021-12-26 PROCEDURE — 2580000003 HC RX 258: Performed by: STUDENT IN AN ORGANIZED HEALTH CARE EDUCATION/TRAINING PROGRAM

## 2021-12-26 PROCEDURE — 99284 EMERGENCY DEPT VISIT MOD MDM: CPT

## 2021-12-26 PROCEDURE — 71045 X-RAY EXAM CHEST 1 VIEW: CPT

## 2021-12-26 PROCEDURE — 83880 ASSAY OF NATRIURETIC PEPTIDE: CPT

## 2021-12-26 PROCEDURE — 6360000002 HC RX W HCPCS: Performed by: STUDENT IN AN ORGANIZED HEALTH CARE EDUCATION/TRAINING PROGRAM

## 2021-12-26 PROCEDURE — 83690 ASSAY OF LIPASE: CPT

## 2021-12-26 PROCEDURE — 80053 COMPREHEN METABOLIC PANEL: CPT

## 2021-12-26 PROCEDURE — 85378 FIBRIN DEGRADE SEMIQUANT: CPT

## 2021-12-26 PROCEDURE — 87635 SARS-COV-2 COVID-19 AMP PRB: CPT

## 2021-12-26 PROCEDURE — 71275 CT ANGIOGRAPHY CHEST: CPT

## 2021-12-26 PROCEDURE — 81025 URINE PREGNANCY TEST: CPT

## 2021-12-26 PROCEDURE — 96375 TX/PRO/DX INJ NEW DRUG ADDON: CPT

## 2021-12-26 PROCEDURE — 83735 ASSAY OF MAGNESIUM: CPT

## 2021-12-26 RX ORDER — POTASSIUM CHLORIDE 7.45 MG/ML
10 INJECTION INTRAVENOUS
Status: COMPLETED | OUTPATIENT
Start: 2021-12-26 | End: 2021-12-27

## 2021-12-26 RX ORDER — ONDANSETRON 4 MG/1
4 TABLET, ORALLY DISINTEGRATING ORAL EVERY 8 HOURS PRN
Qty: 10 TABLET | Refills: 0 | Status: SHIPPED | OUTPATIENT
Start: 2021-12-26 | End: 2021-12-27 | Stop reason: SDUPTHER

## 2021-12-26 RX ORDER — 0.9 % SODIUM CHLORIDE 0.9 %
1000 INTRAVENOUS SOLUTION INTRAVENOUS ONCE
Status: COMPLETED | OUTPATIENT
Start: 2021-12-26 | End: 2021-12-26

## 2021-12-26 RX ORDER — ACETAMINOPHEN 325 MG/1
650 TABLET ORAL ONCE
Status: COMPLETED | OUTPATIENT
Start: 2021-12-26 | End: 2021-12-26

## 2021-12-26 RX ORDER — DEXTROSE AND SODIUM CHLORIDE 5; .9 G/100ML; G/100ML
INJECTION, SOLUTION INTRAVENOUS CONTINUOUS
Status: DISCONTINUED | OUTPATIENT
Start: 2021-12-26 | End: 2021-12-27 | Stop reason: HOSPADM

## 2021-12-26 RX ORDER — POTASSIUM CHLORIDE 20 MEQ/1
40 TABLET, EXTENDED RELEASE ORAL ONCE
Status: COMPLETED | OUTPATIENT
Start: 2021-12-26 | End: 2021-12-26

## 2021-12-26 RX ORDER — DOXYCYCLINE HYCLATE 100 MG
100 TABLET ORAL 2 TIMES DAILY
Qty: 20 TABLET | Refills: 0 | Status: SHIPPED | OUTPATIENT
Start: 2021-12-26 | End: 2021-12-27 | Stop reason: SDUPTHER

## 2021-12-26 RX ORDER — ONDANSETRON 2 MG/ML
4 INJECTION INTRAMUSCULAR; INTRAVENOUS ONCE
Status: COMPLETED | OUTPATIENT
Start: 2021-12-26 | End: 2021-12-26

## 2021-12-26 RX ORDER — BENZONATATE 100 MG/1
100 CAPSULE ORAL 3 TIMES DAILY PRN
Qty: 30 CAPSULE | Refills: 0 | Status: SHIPPED | OUTPATIENT
Start: 2021-12-26 | End: 2021-12-27 | Stop reason: SDUPTHER

## 2021-12-26 RX ADMIN — FAMOTIDINE 20 MG: 10 INJECTION, SOLUTION INTRAVENOUS at 18:04

## 2021-12-26 RX ADMIN — DEXTROSE AND SODIUM CHLORIDE: 5; 900 INJECTION, SOLUTION INTRAVENOUS at 22:01

## 2021-12-26 RX ADMIN — ONDANSETRON HYDROCHLORIDE 4 MG: 2 SOLUTION INTRAMUSCULAR; INTRAVENOUS at 18:04

## 2021-12-26 RX ADMIN — POTASSIUM CHLORIDE 10 MEQ: 10 INJECTION, SOLUTION INTRAVENOUS at 19:06

## 2021-12-26 RX ADMIN — SODIUM CHLORIDE 1000 ML: 9 INJECTION, SOLUTION INTRAVENOUS at 18:03

## 2021-12-26 RX ADMIN — IOPAMIDOL 75 ML: 755 INJECTION, SOLUTION INTRAVENOUS at 20:48

## 2021-12-26 RX ADMIN — POTASSIUM CHLORIDE 10 MEQ: 10 INJECTION, SOLUTION INTRAVENOUS at 20:00

## 2021-12-26 RX ADMIN — POTASSIUM CHLORIDE 40 MEQ: 20 TABLET, EXTENDED RELEASE ORAL at 19:06

## 2021-12-26 RX ADMIN — ACETAMINOPHEN 650 MG: 325 TABLET ORAL at 18:04

## 2021-12-26 ASSESSMENT — PAIN DESCRIPTION - LOCATION: LOCATION: GENERALIZED

## 2021-12-26 ASSESSMENT — PAIN SCALES - GENERAL
PAINLEVEL_OUTOF10: 0
PAINLEVEL_OUTOF10: 8

## 2021-12-26 ASSESSMENT — PAIN DESCRIPTION - PAIN TYPE: TYPE: ACUTE PAIN

## 2021-12-26 NOTE — Clinical Note
Regina Alamo was seen and treated in our emergency department on 12/26/2021. She may return to work on 12/28/2021. If you have any questions or concerns, please don't hesitate to call.       Sathish Manley MD

## 2021-12-26 NOTE — ED PROVIDER NOTES
Department of Emergency Medicine   ED  Provider Note  Admit Date/RoomTime: 12/26/2021  3:14 PM  ED Room: Ono F/          History of Present Illness:  12/26/21, Time: 5:51 PM EST  Chief Complaint   Patient presents with    Influenza     patient states tested positive for influenza A    Concern For COVID-19     loss of taste, c/o n/v, generalized body aches, fever, previous negative    Nausea    Emesis    Generalized Body Aches    Fever       Kristina Guzman is a 39 y.o. female presenting to the ED for nausea, vomiting, abdominal pain, body aches and concern for Covid. The patient states that she has been feeling unwell for the past 5 to 6 days. She was seen 4 days in the emergency department and found to be Covid negative. Patient's symptoms have been persistent. She has been unable to keep anything down. She states that she has had more than 10 episodes of emesis a day. She describes as nonbloody nonbilious. She is also having multiple episodes of diarrhea today which should she describes as nonbloody. Patient denies any abdominal pain. She is having diffuse body aches. Worse with movement. Is an achy sensation that is constant. Nothing improves it. She denies any trauma. She has tried NyQuil and DayQuil without relief. She is having subjective fevers and chills. Also experiencing chest pain. Its midsternal and squeezing or pressure. Seems to be intermittent. Nothing worsens this. No shortness of breath. She has a cough that is productive of green sputum. No recent travel, hormone use, leg swelling. The patient is vaccinated for Covid. She has multiple sick children. She would like be tested for Covid again.     Review of Systems:   Constitutional symptoms: Positive for fever  Eyes: Denies eye pain  Ears, Nose, Mouth, Throat: Denies ear drainage  Cardiovascular: Positive for chest pain  Respiratory: Positive for shortness of breath  Gastrointestinal: Denies blood per rectum  Genitourinary: Denies hematuria  Skin: Denies rashes  Neurological: Denies headache  Musculoskeletal: Positive for body aches    --------------------------------------------- PAST HISTORY ---------------------------------------------  Past Medical History:  has a past medical history of Gestational diabetes mellitus (GDM) affecting pregnancy. Past Surgical History:  has a past surgical history that includes [place] tooth extraction;  section (N/A, 2019); Insert Midline Catheter (2019); and Abdomen surgery (Right, 2019). Social History:  reports that she quit smoking about 3 years ago. Her smoking use included cigarettes. She started smoking about 19 years ago. She smoked 0.50 packs per day. She has never used smokeless tobacco. She reports previous drug use. Frequency: 21.00 times per week. Drug: Marijuana Noeakiko Cobb). She reports that she does not drink alcohol. Family History: family history is not on file. She was adopted. . Unless otherwise noted, family history is non contributory    The patients home medications have been reviewed. Allergies: Pcn [penicillins] and Vancomycin    I have reviewed the past medical history, past surgical history, social history, and family history    ---------------------------------------------------PHYSICAL EXAM--------------------------------------    General: The patient is conversational and lying comfortably in bed. Head: Normocephalic and atraumatic. Eyes: Sclera non-icteric and no conjunctival injection  ENT: Nasal and oral membranes appear dry. Bilateral tympanic membranes visualized without erythema or bulging. Large amount of cerumen in the right external auditory canal.  No tenderness of the tragus or auricle. Neck: Trachea midline. No JVD  Respiratory: Lungs clear to auscultation bilaterally. Patient speaking in full sentences. Cardiovascular: Regular rate. No pedal edema.   Gastrointestinal:  Abdomen is soft and nondistended. Bowel sounds present. There is no tenderness. There is no guarding or rebound. Musculoskeletal: No deformity. No bony tenderness. Able to move all extremities  Skin: Skin warm and dry. No rashes. Neurologic: No gross motor deficits. No aphasia. Psychiatric: Normal affect.    -------------------------------------------------- RESULTS -------------------------------------------------  I have personally reviewed all laboratory and imaging results for this patient. Results are listed below.      LABS: (Lab results interpreted by me)  Results for orders placed or performed during the hospital encounter of 12/26/21   COVID-19, Rapid    Specimen: Nasopharyngeal Swab   Result Value Ref Range    SARS-CoV-2, NAAT Not Detected Not Detected   CBC Auto Differential   Result Value Ref Range    WBC 8.0 4.5 - 11.5 E9/L    RBC 4.87 3.50 - 5.50 E12/L    Hemoglobin 14.7 11.5 - 15.5 g/dL    Hematocrit 42.4 34.0 - 48.0 %    MCV 87.1 80.0 - 99.9 fL    MCH 30.2 26.0 - 35.0 pg    MCHC 34.7 (H) 32.0 - 34.5 %    RDW 12.8 11.5 - 15.0 fL    Platelets 909 886 - 888 E9/L    MPV 10.3 7.0 - 12.0 fL    Neutrophils % 68.1 43.0 - 80.0 %    Immature Granulocytes % 0.3 0.0 - 5.0 %    Lymphocytes % 19.1 (L) 20.0 - 42.0 %    Monocytes % 12.2 (H) 2.0 - 12.0 %    Eosinophils % 0.0 0.0 - 6.0 %    Basophils % 0.3 0.0 - 2.0 %    Neutrophils Absolute 5.43 1.80 - 7.30 E9/L    Immature Granulocytes # 0.02 E9/L    Lymphocytes Absolute 1.52 1.50 - 4.00 E9/L    Monocytes Absolute 0.97 (H) 0.10 - 0.95 E9/L    Eosinophils Absolute 0.00 (L) 0.05 - 0.50 E9/L    Basophils Absolute 0.02 0.00 - 0.20 E9/L   Comprehensive Metabolic Panel w/ Reflex to MG   Result Value Ref Range    Sodium 136 132 - 146 mmol/L    Potassium reflex Magnesium 3.0 (L) 3.5 - 5.0 mmol/L    Chloride 96 (L) 98 - 107 mmol/L    CO2 23 22 - 29 mmol/L    Anion Gap 17 (H) 7 - 16 mmol/L    Glucose 94 74 - 99 mg/dL    BUN 11 6 - 20 mg/dL    CREATININE 0.6 0.5 - 1.0 mg/dL    GFR Non-African American >60 >=60 mL/min/1.73    GFR African American >60     Calcium 9.0 8.6 - 10.2 mg/dL    Total Protein 8.1 6.4 - 8.3 g/dL    Albumin 4.3 3.5 - 5.2 g/dL    Total Bilirubin 0.5 0.0 - 1.2 mg/dL    Alkaline Phosphatase 79 35 - 104 U/L    ALT 12 0 - 32 U/L    AST 14 0 - 31 U/L   Troponin   Result Value Ref Range    Troponin, High Sensitivity <6 0 - 9 ng/L   D-Dimer, Quantitative   Result Value Ref Range    D-Dimer, Quant 512 ng/mL DDU   Brain Natriuretic Peptide   Result Value Ref Range    Pro-BNP 9 0 - 125 pg/mL   Lipase   Result Value Ref Range    Lipase 19 13 - 60 U/L   Pregnancy, urine   Result Value Ref Range    HCG(Urine) Pregnancy Test NEGATIVE NEGATIVE   Urinalysis with Microscopic   Result Value Ref Range    Color, UA Yellow Straw/Yellow    Clarity, UA Clear Clear    Glucose, Ur Negative Negative mg/dL    Bilirubin Urine Negative Negative    Ketones, Urine Negative Negative mg/dL    Specific Gravity, UA 1.010 1.005 - 1.030    Blood, Urine SMALL (A) Negative    pH, UA 6.0 5.0 - 9.0    Protein, UA TRACE Negative mg/dL    Urobilinogen, Urine 0.2 <2.0 E.U./dL    Nitrite, Urine Negative Negative    Leukocyte Esterase, Urine Negative Negative    WBC, UA 0-1 0 - 5 /HPF    RBC, UA NONE 0 - 2 /HPF    Epithelial Cells, UA FEW /HPF    Bacteria, UA FEW (A) None Seen /HPF   Magnesium   Result Value Ref Range    Magnesium 2.3 1.6 - 2.6 mg/dL   Lactic Acid, Plasma   Result Value Ref Range    Lactic Acid 0.7 0.5 - 2.2 mmol/L   Beta-Hydroxybutyrate   Result Value Ref Range    Beta-Hydroxybutyrate 0.45 (H) 0.02 - 0.27 mmol/L   Basic metabolic panel   Result Value Ref Range    Sodium 133 132 - 146 mmol/L    Potassium 3.5 3.5 - 5.0 mmol/L    Chloride 99 98 - 107 mmol/L    CO2 21 (L) 22 - 29 mmol/L    Anion Gap 13 7 - 16 mmol/L    Glucose 95 74 - 99 mg/dL    BUN 10 6 - 20 mg/dL    CREATININE 0.6 0.5 - 1.0 mg/dL    GFR Non-African American >60 >=60 mL/min/1.73    GFR African American >60     Calcium 8.5 (L) 8.6 - electrolytes improved she will likely be discharged. She does request Tessalon Perles. These will be provided for her. We have stressed the importance of appropriate hydration. She will also be given prescription for Zofran. She is to follow with her primary care physician. Verbalized understanding and agreeable to the plan. This patient's ED course included: a personal history and physicial examination and re-evaluation prior to disposition    This patient has remained hemodynamically stable during their ED course. Consultations:  None    The cardiac monitor revealed sinus rhythm with a heart rate in the 80s as interpreted by me. The cardiac monitor was ordered secondary to the patient's chest pain and to monitor the patient for dysrhythmia. CPT [id number]    Oxygen Saturation Interpretation: 97 % on room air. Normal    Counseling:   I have spoken with the patient and discussed todays results, in addition to providing specific details for the plan of care and counseling regarding the diagnosis and prognosis. Questions are answered at this time and they are agreeable with the plan.     --------------------------------- IMPRESSION AND DISPOSITION ---------------------------------    IMPRESSION  1. Nausea vomiting and diarrhea    2. Hypokalemia    3. Viral syndrome        DISPOSITION  Disposition: Pending further work-up, likely discharge  Patient condition is fair    IDr. Imelda, am the primary provider of record    NOTE: This report was transcribed using voice recognition software.  Every effort was made to ensure accuracy; however, inadvertent computerized transcription errors may be present        Imelda Cartagena MD  12/26/21 [id number]

## 2021-12-26 NOTE — ED NOTES
Bed:   Expected date: 12/26/21  Expected time:   Means of arrival:   Comments:  triage     Isaias Nielson RN  12/26/21 0104

## 2021-12-27 RX ORDER — ONDANSETRON 4 MG/1
4 TABLET, ORALLY DISINTEGRATING ORAL EVERY 8 HOURS PRN
Qty: 10 TABLET | Refills: 0 | Status: SHIPPED | OUTPATIENT
Start: 2021-12-27

## 2021-12-27 RX ORDER — DOXYCYCLINE HYCLATE 100 MG
100 TABLET ORAL 2 TIMES DAILY
Qty: 20 TABLET | Refills: 0 | Status: SHIPPED | OUTPATIENT
Start: 2021-12-27 | End: 2022-01-06

## 2021-12-27 RX ORDER — BENZONATATE 100 MG/1
100 CAPSULE ORAL 3 TIMES DAILY PRN
Qty: 30 CAPSULE | Refills: 0 | Status: SHIPPED | OUTPATIENT
Start: 2021-12-27 | End: 2022-01-03

## 2021-12-27 NOTE — ED NOTES
Patient was turned over to me labs were noted reviewed as well as CT was still pending. Patient reportedly tested positive for influenza. Patient concerning about Covid. Patient has been having nausea vomiting and body aches. Patient's labs noted reviewed repeat electrolytes within normal limits and CO2 within normal limits. Anion gap is 13. Patient CT shows no signs of PE. Patient does report that she has been coughing up green sputum. She reports no headache she reports no active chest pain she reports no chest pain occurs when she coughs. Abdomen is soft and nontender. Patient neurologically intact she has no meningeal signs. Patient afebrile here. Plan will be to discharge home.      Edelmira Villegas MD  12/26/21 [address] [facility] Lauren Angelo MD  12/26/21 [id number]

## 2021-12-28 LAB
EKG ATRIAL RATE: 74 BPM
EKG P AXIS: 64 DEGREES
EKG P-R INTERVAL: 152 MS
EKG Q-T INTERVAL: 406 MS
EKG QRS DURATION: 88 MS
EKG QTC CALCULATION (BAZETT): 450 MS
EKG R AXIS: 62 DEGREES
EKG T AXIS: 37 DEGREES
EKG VENTRICULAR RATE: 74 BPM

## 2024-02-14 ENCOUNTER — APPOINTMENT (OUTPATIENT)
Dept: GENERAL RADIOLOGY | Age: 39
End: 2024-02-14
Payer: COMMERCIAL

## 2024-02-14 ENCOUNTER — HOSPITAL ENCOUNTER (EMERGENCY)
Age: 39
Discharge: HOME | End: 2024-02-14
Attending: EMERGENCY MEDICINE
Payer: COMMERCIAL

## 2024-02-14 VITALS
HEART RATE: 108 BPM | OXYGEN SATURATION: 98 % | HEIGHT: 62 IN | DIASTOLIC BLOOD PRESSURE: 76 MMHG | SYSTOLIC BLOOD PRESSURE: 109 MMHG | TEMPERATURE: 98.8 F | BODY MASS INDEX: 31.65 KG/M2 | RESPIRATION RATE: 18 BRPM | WEIGHT: 172 LBS

## 2024-02-14 DIAGNOSIS — J11.1 INFLUENZA: Primary | ICD-10-CM

## 2024-02-14 LAB
ANION GAP SERPL CALCULATED.3IONS-SCNC: 18 MMOL/L (ref 7–16)
BASOPHILS # BLD: 0.02 K/UL (ref 0–0.2)
BASOPHILS NFR BLD: 0 % (ref 0–2)
BUN SERPL-MCNC: 23 MG/DL (ref 6–20)
CALCIUM SERPL-MCNC: 9.5 MG/DL (ref 8.6–10.2)
CHLORIDE SERPL-SCNC: 92 MMOL/L (ref 98–107)
CO2 SERPL-SCNC: 21 MMOL/L (ref 22–29)
CREAT SERPL-MCNC: 0.9 MG/DL (ref 0.5–1)
EOSINOPHIL # BLD: 0.15 K/UL (ref 0.05–0.5)
EOSINOPHILS RELATIVE PERCENT: 2 % (ref 0–6)
ERYTHROCYTE [DISTWIDTH] IN BLOOD BY AUTOMATED COUNT: 12.6 % (ref 11.5–15)
GFR SERPL CREATININE-BSD FRML MDRD: >60 ML/MIN/1.73M2
GLUCOSE SERPL-MCNC: 103 MG/DL (ref 74–99)
HCG, URINE, POC: NEGATIVE
HCT VFR BLD AUTO: 47.3 % (ref 34–48)
HGB BLD-MCNC: 16.6 G/DL (ref 11.5–15.5)
IMM GRANULOCYTES # BLD AUTO: <0.03 K/UL (ref 0–0.58)
IMM GRANULOCYTES NFR BLD: 0 % (ref 0–5)
INFLUENZA A BY PCR: DETECTED
INFLUENZA B BY PCR: NOT DETECTED
LYMPHOCYTES NFR BLD: 1.25 K/UL (ref 1.5–4)
LYMPHOCYTES RELATIVE PERCENT: 19 % (ref 20–42)
Lab: NORMAL
MCH RBC QN AUTO: 31 PG (ref 26–35)
MCHC RBC AUTO-ENTMCNC: 35.1 G/DL (ref 32–34.5)
MCV RBC AUTO: 88.4 FL (ref 80–99.9)
MONOCYTES NFR BLD: 1.37 K/UL (ref 0.1–0.95)
MONOCYTES NFR BLD: 21 % (ref 2–12)
NEGATIVE QC PASS/FAIL: NORMAL
NEUTROPHILS NFR BLD: 57 % (ref 43–80)
NEUTS SEG NFR BLD: 3.66 K/UL (ref 1.8–7.3)
PLATELET # BLD AUTO: 197 K/UL (ref 130–450)
PMV BLD AUTO: 12.3 FL (ref 7–12)
POSITIVE QC PASS/FAIL: NORMAL
POTASSIUM SERPL-SCNC: 3.5 MMOL/L (ref 3.5–5)
RBC # BLD AUTO: 5.35 M/UL (ref 3.5–5.5)
RSV BY PCR: NOT DETECTED
SARS-COV-2 RDRP RESP QL NAA+PROBE: NOT DETECTED
SODIUM SERPL-SCNC: 131 MMOL/L (ref 132–146)
SPECIMEN DESCRIPTION: NORMAL
SPECIMEN SOURCE: NORMAL
WBC OTHER # BLD: 6.5 K/UL (ref 4.5–11.5)

## 2024-02-14 PROCEDURE — 87635 SARS-COV-2 COVID-19 AMP PRB: CPT

## 2024-02-14 PROCEDURE — 99284 EMERGENCY DEPT VISIT MOD MDM: CPT

## 2024-02-14 PROCEDURE — 6360000002 HC RX W HCPCS

## 2024-02-14 PROCEDURE — 87502 INFLUENZA DNA AMP PROBE: CPT

## 2024-02-14 PROCEDURE — 71045 X-RAY EXAM CHEST 1 VIEW: CPT

## 2024-02-14 PROCEDURE — 2580000003 HC RX 258: Performed by: EMERGENCY MEDICINE

## 2024-02-14 PROCEDURE — 80048 BASIC METABOLIC PNL TOTAL CA: CPT

## 2024-02-14 PROCEDURE — 2580000003 HC RX 258

## 2024-02-14 PROCEDURE — 6370000000 HC RX 637 (ALT 250 FOR IP): Performed by: EMERGENCY MEDICINE

## 2024-02-14 PROCEDURE — 85025 COMPLETE CBC W/AUTO DIFF WBC: CPT

## 2024-02-14 PROCEDURE — 87634 RSV DNA/RNA AMP PROBE: CPT

## 2024-02-14 PROCEDURE — 6360000002 HC RX W HCPCS: Performed by: EMERGENCY MEDICINE

## 2024-02-14 PROCEDURE — 96375 TX/PRO/DX INJ NEW DRUG ADDON: CPT

## 2024-02-14 PROCEDURE — 96374 THER/PROPH/DIAG INJ IV PUSH: CPT

## 2024-02-14 RX ORDER — 0.9 % SODIUM CHLORIDE 0.9 %
1000 INTRAVENOUS SOLUTION INTRAVENOUS ONCE
Status: COMPLETED | OUTPATIENT
Start: 2024-02-14 | End: 2024-02-14

## 2024-02-14 RX ORDER — ONDANSETRON 2 MG/ML
4 INJECTION INTRAMUSCULAR; INTRAVENOUS EVERY 6 HOURS PRN
Status: DISCONTINUED | OUTPATIENT
Start: 2024-02-14 | End: 2024-02-14 | Stop reason: HOSPADM

## 2024-02-14 RX ORDER — OSELTAMIVIR PHOSPHATE 75 MG/1
75 CAPSULE ORAL 2 TIMES DAILY
Qty: 20 CAPSULE | Refills: 0 | Status: SHIPPED | OUTPATIENT
Start: 2024-02-14 | End: 2024-02-24

## 2024-02-14 RX ORDER — KETOROLAC TROMETHAMINE 30 MG/ML
15 INJECTION, SOLUTION INTRAMUSCULAR; INTRAVENOUS ONCE
Status: COMPLETED | OUTPATIENT
Start: 2024-02-14 | End: 2024-02-14

## 2024-02-14 RX ORDER — OSELTAMIVIR PHOSPHATE 75 MG/1
75 CAPSULE ORAL ONCE
Status: COMPLETED | OUTPATIENT
Start: 2024-02-14 | End: 2024-02-14

## 2024-02-14 RX ADMIN — SODIUM CHLORIDE 1000 ML: 9 INJECTION, SOLUTION INTRAVENOUS at 12:04

## 2024-02-14 RX ADMIN — SODIUM CHLORIDE 1000 ML: 9 INJECTION, SOLUTION INTRAVENOUS at 12:03

## 2024-02-14 RX ADMIN — OSELTAMIVIR PHOSPHATE 75 MG: 75 CAPSULE ORAL at 11:59

## 2024-02-14 RX ADMIN — ONDANSETRON 4 MG: 2 INJECTION INTRAMUSCULAR; INTRAVENOUS at 10:03

## 2024-02-14 RX ADMIN — KETOROLAC TROMETHAMINE 15 MG: 30 INJECTION INTRAMUSCULAR; INTRAVENOUS at 11:58

## 2024-02-14 NOTE — ED PROVIDER NOTES
[facility] EMERGENCY DEPARTMENT  EMERGENCY DEPARTMENT ENCOUNTER      Pt Name: Nasrin Sofia  MRN: [id number]  Birthdate 1985  Date of evaluation: 2024  Provider: Gabriel Toledo MD  PCP: No primary care provider on file.  Note Started: 8:47 AM EST 24    CHIEF COMPLAINT       Chief Complaint   Patient presents with    Emesis     Ongoing for three days, reports sore throat from vomiting     Fatigue       HISTORY OF PRESENT ILLNESS: 1 or more Elements   History From: Patient    Limitations to history : None  Social Determinants : None    Nasrin Sofia is a 38 y.o. female with no significant past medical history who presents to the emergency department complaints of nausea, vomiting, fatigue.  Patient states that she started feeling ill on Monday, with a sore throat, coughing, fever.  Patient is unaware of what makes symptoms better or worse.  She states that fatigue has been getting worse since Monday.  She is also been complaining of some unrelenting nausea and has not been able to hold any food or fluids.  Patient is also complaining some chest pain every time she coughs.  Patient states that her kids got sick in school, and might have been exposed.  She denies any diarrhea, bloody stools, constipation, or abdominal pain.    Nursing Notes were all reviewed and agreed with or any disagreements were addressed in the HPI.    ROS:   Pertinent positives and negatives are stated within HPI, all other systems reviewed and are negative.    --------------------------------------------- PAST HISTORY ---------------------------------------------  Past Medical History:  has a past medical history of Gestational diabetes mellitus (GDM) affecting pregnancy.    Past Surgical History:  has a past surgical history that includes [place] tooth extraction;  section (N/A, 2019); Insert Midline Catheter (2019); and Abdomen surgery (Right,

## (undated) DEVICE — COVER,LIGHT HANDLE,FLX,2/PK: Brand: MEDLINE INDUSTRIES, INC.

## (undated) DEVICE — 3000CC GUARDIAN II: Brand: GUARDIAN

## (undated) DEVICE — TRAP SPEC MUCUS FOR SUCT

## (undated) DEVICE — GOWN,SIRUS,FABRNF,L,20/CS: Brand: MEDLINE

## (undated) DEVICE — MEDI-VAC YANKAUER SUCTION HANDLE W/BULBOUS TIP: Brand: CARDINAL HEALTH

## (undated) DEVICE — TOWEL,OR,DSP,ST,BLUE,STD,6/PK,12PK/CS: Brand: MEDLINE

## (undated) DEVICE — CESAREAN BIRTH PACK II: Brand: MEDLINE INDUSTRIES, INC.

## (undated) DEVICE — SOLUTION IV IRRIG POUR BRL 0.9% SODIUM CHL 2F7124

## (undated) DEVICE — TUBE BLD COLLECT ST 1 SIL COAT 7ML 10ML

## (undated) DEVICE — PACK PROCEDURE SURG GEN CUST

## (undated) DEVICE — STAPLER SKIN SQ 30 ABSRB STPL DISP INSORB

## (undated) DEVICE — COUNTER NDL 30 COUNT DBL MAG

## (undated) DEVICE — APPLICATOR PREP 26ML 0.7% IOD POVACRYLEX 74% ISO ALC ST

## (undated) DEVICE — GOWN,SIRUS,FABRNF,XL,20/CS: Brand: MEDLINE

## (undated) DEVICE — TUBING, SUCTION, 3/16" X 12', STRAIGHT: Brand: MEDLINE

## (undated) DEVICE — SPONGE LAP W18XL18IN WHT COT 4 PLY FLD STRUNG RADPQ DISP ST

## (undated) DEVICE — CONTAINER SPEC 64OZ POLYPR PATH SNAP LOK CAP W/ LID

## (undated) DEVICE — CANISTER NEG PRSS 1000ML W/ GEL INFOVAC

## (undated) DEVICE — GLOVE SURG SZ 65 L12IN FNGR THK83MIL CRM POLYISOPRENE

## (undated) DEVICE — BLADE CLIPPER GEN PURP NS

## (undated) DEVICE — PENCIL ES L3M BTTN SWCH HOLSTER W/ BLDE ELECTRD EDGE

## (undated) DEVICE — AGENT HEMSTAT 3GM PURIFIED PLNT STARCH PWD ABSRB ARISTA AH

## (undated) DEVICE — GLOVE SURG SZ 75 L12IN FNGR THK83MIL CRM POLYISOPRENE

## (undated) DEVICE — DRESSING WND VAC SM GRANUFOAM SENSATRAC

## (undated) DEVICE — BLADE SURG NO20 S STL STR DISP GLASSVAN

## (undated) DEVICE — SHEET,DRAPE,53X77,STERILE: Brand: MEDLINE

## (undated) DEVICE — DOUBLE BASIN SET: Brand: MEDLINE INDUSTRIES, INC.

## (undated) DEVICE — NEEDLE HYPO 21GA L2IN GRN POLYPR HUB S STL REG BVL STR W/O

## (undated) DEVICE — GOWN,SIRUS,POLYRNF,BRTHSLV,XLN/XXL,18/CS: Brand: MEDLINE

## (undated) DEVICE — CATHETERIZATION KIT FOL16 FR 2000 CC DRAINAGE BG LUBRICATH

## (undated) DEVICE — CONTAINER,SPEC,PNEUM TUBE,3OZ,STRL PATH: Brand: MEDLINE

## (undated) DEVICE — SUTURE VCRL + SZ 3-0 L36IN ABSRB UD L36MM CT-1 1/2 CIR VCP944H

## (undated) DEVICE — ELECTRODE PT RET AD L9FT HI MOIST COND ADH HYDRGEL CORDED

## (undated) DEVICE — BAG SPECIMEN BIOHAZARD 6IN X 9IN

## (undated) DEVICE — READY WET SKIN SCRUB TRAY-LF: Brand: MEDLINE INDUSTRIES, INC.

## (undated) DEVICE — SKIN AFFIX SURG ADHESIVE 72/CS 0.55ML: Brand: MEDLINE

## (undated) DEVICE — SHEET, T, LAPAROTOMY, STERILE: Brand: MEDLINE

## (undated) DEVICE — 4-PORT MANIFOLD: Brand: NEPTUNE 2

## (undated) DEVICE — SUTURE VCRL + SZ 0 L36IN ABSRB VLT L36MM CT-1 1/2 CIR VCP346H

## (undated) DEVICE — PEN: MARKING STD 100/CS: Brand: MEDICAL ACTION INDUSTRIES